# Patient Record
Sex: FEMALE | Race: BLACK OR AFRICAN AMERICAN | NOT HISPANIC OR LATINO | URBAN - METROPOLITAN AREA
[De-identification: names, ages, dates, MRNs, and addresses within clinical notes are randomized per-mention and may not be internally consistent; named-entity substitution may affect disease eponyms.]

---

## 2019-08-10 ENCOUNTER — INPATIENT (INPATIENT)
Facility: HOSPITAL | Age: 34
LOS: 3 days | Discharge: HOME | DRG: 854 | End: 2019-08-14
Attending: HOSPITALIST | Admitting: HOSPITALIST
Payer: COMMERCIAL

## 2019-08-10 VITALS
RESPIRATION RATE: 16 BRPM | HEART RATE: 117 BPM | TEMPERATURE: 100 F | SYSTOLIC BLOOD PRESSURE: 147 MMHG | HEIGHT: 64 IN | DIASTOLIC BLOOD PRESSURE: 89 MMHG | WEIGHT: 244.05 LBS | OXYGEN SATURATION: 97 %

## 2019-08-10 LAB
ALBUMIN SERPL ELPH-MCNC: 4 G/DL — SIGNIFICANT CHANGE UP (ref 3.3–5)
ALP SERPL-CCNC: 100 U/L — SIGNIFICANT CHANGE UP (ref 40–120)
ALT FLD-CCNC: 10 U/L — SIGNIFICANT CHANGE UP (ref 10–45)
ANION GAP SERPL CALC-SCNC: 20 MMOL/L — HIGH (ref 5–17)
AST SERPL-CCNC: 11 U/L — SIGNIFICANT CHANGE UP (ref 10–40)
BASE EXCESS BLDV CALC-SCNC: 3.3 MMOL/L — HIGH (ref -2–2)
BILIRUB SERPL-MCNC: 1 MG/DL — SIGNIFICANT CHANGE UP (ref 0.2–1.2)
BUN SERPL-MCNC: 6 MG/DL — LOW (ref 7–23)
CA-I SERPL-SCNC: 1.11 MMOL/L — LOW (ref 1.12–1.3)
CALCIUM SERPL-MCNC: 9.3 MG/DL — SIGNIFICANT CHANGE UP (ref 8.4–10.5)
CHLORIDE BLDV-SCNC: 99 MMOL/L — SIGNIFICANT CHANGE UP (ref 96–108)
CHLORIDE SERPL-SCNC: 92 MMOL/L — LOW (ref 96–108)
CO2 BLDV-SCNC: 30 MMOL/L — SIGNIFICANT CHANGE UP (ref 22–30)
CO2 SERPL-SCNC: 22 MMOL/L — SIGNIFICANT CHANGE UP (ref 22–31)
CREAT SERPL-MCNC: 0.7 MG/DL — SIGNIFICANT CHANGE UP (ref 0.5–1.3)
GAS PNL BLDV: 131 MMOL/L — LOW (ref 135–145)
GAS PNL BLDV: SIGNIFICANT CHANGE UP
GAS PNL BLDV: SIGNIFICANT CHANGE UP
GLUCOSE BLDV-MCNC: 410 MG/DL — HIGH (ref 70–99)
GLUCOSE SERPL-MCNC: 414 MG/DL — HIGH (ref 70–99)
HCO3 BLDV-SCNC: 29 MMOL/L — SIGNIFICANT CHANGE UP (ref 21–29)
HCT VFR BLD CALC: 43.2 % — SIGNIFICANT CHANGE UP (ref 34.5–45)
HCT VFR BLDA CALC: 42 % — SIGNIFICANT CHANGE UP (ref 39–50)
HGB BLD CALC-MCNC: 13.5 G/DL — SIGNIFICANT CHANGE UP (ref 11.5–15.5)
HGB BLD-MCNC: 12.6 G/DL — SIGNIFICANT CHANGE UP (ref 11.5–15.5)
LACTATE BLDV-MCNC: 1.9 MMOL/L — SIGNIFICANT CHANGE UP (ref 0.7–2)
MCHC RBC-ENTMCNC: 24.6 PG — LOW (ref 27–34)
MCHC RBC-ENTMCNC: 29.3 GM/DL — LOW (ref 32–36)
MCV RBC AUTO: 84 FL — SIGNIFICANT CHANGE UP (ref 80–100)
PCO2 BLDV: 49 MMHG — SIGNIFICANT CHANGE UP (ref 35–50)
PH BLDV: 7.39 — SIGNIFICANT CHANGE UP (ref 7.35–7.45)
PLATELET # BLD AUTO: 319 K/UL — SIGNIFICANT CHANGE UP (ref 150–400)
PO2 BLDV: 24 MMHG — LOW (ref 25–45)
POTASSIUM BLDV-SCNC: 3.4 MMOL/L — LOW (ref 3.5–5.3)
POTASSIUM SERPL-MCNC: 3.6 MMOL/L — SIGNIFICANT CHANGE UP (ref 3.5–5.3)
POTASSIUM SERPL-SCNC: 3.6 MMOL/L — SIGNIFICANT CHANGE UP (ref 3.5–5.3)
PROT SERPL-MCNC: 8.3 G/DL — SIGNIFICANT CHANGE UP (ref 6–8.3)
RBC # BLD: 5.14 M/UL — SIGNIFICANT CHANGE UP (ref 3.8–5.2)
RBC # FLD: 14.5 % — SIGNIFICANT CHANGE UP (ref 10.3–14.5)
SAO2 % BLDV: 37 % — LOW (ref 67–88)
SODIUM SERPL-SCNC: 134 MMOL/L — LOW (ref 135–145)
WBC # BLD: 13 K/UL — HIGH (ref 3.8–10.5)
WBC # FLD AUTO: 13 K/UL — HIGH (ref 3.8–10.5)

## 2019-08-10 PROCEDURE — 71260 CT THORAX DX C+: CPT | Mod: 26

## 2019-08-10 PROCEDURE — 99218: CPT

## 2019-08-10 RX ORDER — SODIUM CHLORIDE 9 MG/ML
1000 INJECTION INTRAMUSCULAR; INTRAVENOUS; SUBCUTANEOUS
Refills: 0 | Status: DISCONTINUED | OUTPATIENT
Start: 2019-08-10 | End: 2019-08-11

## 2019-08-10 RX ORDER — DEXTROSE 50 % IN WATER 50 %
12.5 SYRINGE (ML) INTRAVENOUS ONCE
Refills: 0 | Status: DISCONTINUED | OUTPATIENT
Start: 2019-08-10 | End: 2019-08-14

## 2019-08-10 RX ORDER — SODIUM CHLORIDE 9 MG/ML
1000 INJECTION, SOLUTION INTRAVENOUS
Refills: 0 | Status: DISCONTINUED | OUTPATIENT
Start: 2019-08-10 | End: 2019-08-14

## 2019-08-10 RX ORDER — ACETAMINOPHEN 500 MG
975 TABLET ORAL EVERY 6 HOURS
Refills: 0 | Status: DISCONTINUED | OUTPATIENT
Start: 2019-08-10 | End: 2019-08-11

## 2019-08-10 RX ORDER — SODIUM CHLORIDE 9 MG/ML
2000 INJECTION, SOLUTION INTRAVENOUS ONCE
Refills: 0 | Status: COMPLETED | OUTPATIENT
Start: 2019-08-10 | End: 2019-08-10

## 2019-08-10 RX ORDER — DEXTROSE 50 % IN WATER 50 %
15 SYRINGE (ML) INTRAVENOUS ONCE
Refills: 0 | Status: DISCONTINUED | OUTPATIENT
Start: 2019-08-10 | End: 2019-08-14

## 2019-08-10 RX ORDER — DEXTROSE 50 % IN WATER 50 %
25 SYRINGE (ML) INTRAVENOUS ONCE
Refills: 0 | Status: DISCONTINUED | OUTPATIENT
Start: 2019-08-10 | End: 2019-08-14

## 2019-08-10 RX ORDER — SODIUM CHLORIDE 9 MG/ML
3 INJECTION INTRAMUSCULAR; INTRAVENOUS; SUBCUTANEOUS EVERY 8 HOURS
Refills: 0 | Status: DISCONTINUED | OUTPATIENT
Start: 2019-08-10 | End: 2019-08-14

## 2019-08-10 RX ORDER — INSULIN LISPRO 100/ML
VIAL (ML) SUBCUTANEOUS
Refills: 0 | Status: DISCONTINUED | OUTPATIENT
Start: 2019-08-10 | End: 2019-08-11

## 2019-08-10 RX ORDER — INSULIN LISPRO 100/ML
VIAL (ML) SUBCUTANEOUS AT BEDTIME
Refills: 0 | Status: DISCONTINUED | OUTPATIENT
Start: 2019-08-10 | End: 2019-08-11

## 2019-08-10 RX ORDER — GLUCAGON INJECTION, SOLUTION 0.5 MG/.1ML
1 INJECTION, SOLUTION SUBCUTANEOUS ONCE
Refills: 0 | Status: DISCONTINUED | OUTPATIENT
Start: 2019-08-10 | End: 2019-08-14

## 2019-08-10 RX ORDER — KETOROLAC TROMETHAMINE 30 MG/ML
15 SYRINGE (ML) INJECTION ONCE
Refills: 0 | Status: DISCONTINUED | OUTPATIENT
Start: 2019-08-10 | End: 2019-08-10

## 2019-08-10 RX ADMIN — Medication 100 MILLIGRAM(S): at 18:21

## 2019-08-10 RX ADMIN — Medication 2: at 22:08

## 2019-08-10 RX ADMIN — Medication 975 MILLIGRAM(S): at 22:57

## 2019-08-10 RX ADMIN — SODIUM CHLORIDE 2000 MILLILITER(S): 9 INJECTION, SOLUTION INTRAVENOUS at 18:21

## 2019-08-10 RX ADMIN — Medication 15 MILLIGRAM(S): at 18:21

## 2019-08-10 RX ADMIN — SODIUM CHLORIDE 150 MILLILITER(S): 9 INJECTION INTRAMUSCULAR; INTRAVENOUS; SUBCUTANEOUS at 22:12

## 2019-08-10 RX ADMIN — Medication 975 MILLIGRAM(S): at 22:16

## 2019-08-10 RX ADMIN — SODIUM CHLORIDE 3 MILLILITER(S): 9 INJECTION INTRAMUSCULAR; INTRAVENOUS; SUBCUTANEOUS at 22:06

## 2019-08-10 NOTE — ED CDU PROVIDER INITIAL DAY NOTE - PHYSICAL EXAMINATION
Skin: large induration about 7 cm abscess on upper left mid thoracic back, tender to palpation, erythematous, non draining but with an area purulence

## 2019-08-10 NOTE — ED CDU PROVIDER INITIAL DAY NOTE - OBJECTIVE STATEMENT
33y/o female PMHx DM II non compliant on metformin 500mg BID and HTN non compliant on Labetalol was directed to the ED by Urgent Care for back draining back abscess. Patient states that 9 days ago her sister noticed a bump on her back and thought it might have been a mosquito bite. About 2 days later she began to notice that the bump started to drain yellow fluid. During the past week she has had subjective fevers, night sweats, chills, and pain at the site of bump. She denies IVDA, chest pain, SOB, abdominal pain, dry mouth, N/V/D, dysuria, urinary freq, cough, back pain, headache, neck pain    ED course: WBC 13, Gap 20, glucose 414, negative HCG, Lactate 1.9, CT chest showed phegmon in left upper back. Obtained blood cultures x2. Patient received IVF, toradol, and clindamycin

## 2019-08-10 NOTE — ED PROVIDER NOTE - OBJECTIVE STATEMENT
35y/o F w/ h/o T2DM and HTN presents to the ED with a boil on the back. Patient states that 9 days ago her sister noticed a bump on her back and thought it might have been a mosquito bite. About 2 days later she began to notice that the bump started to drain yellow fluid. During the past week she has had subjective fevers, night sweats, chills, and pain at the site of bump. She denies IVDA, chest pain, SOB, abdominal pain, dry mouth.

## 2019-08-10 NOTE — ED PROVIDER NOTE - NS ED ROS FT
GENERAL: subjective fever and chills  HEENT: denies congestion, dysphagia, lightheadedness, dry mouth  CARDIAC: denies chest pain, palpitations  PULM: denies dyspnea, wheezing, cough  GI: denies abdominal pain, nausea, vomiting, diarrhea, constipation, melena, hematochezia  : denies dysuria, frequency, incontinence, hematuria  NEURO: denies headache, motor weakness, sensory changes  MSK: denies joint or muscle pain  SKIN: new rashes bump on back, no hives  HEME: denies active bleeding, bruising

## 2019-08-10 NOTE — ED ADULT NURSE NOTE - OBJECTIVE STATEMENT
35 y/o female history of DM Type 2 presents to the ED from urgent care c/o boil in the back. Patient states that 9 days ago she had a bump on her back and had scratched it thinking it was a bug bite. Patient states that two days after it appeared she noticed yellow drainage coming out from the site. Urgent care sent patient here because they were unable to drain the cyst. Patient had a fever and took two tylenol prior to coming to ED. Denies  chills, n/v, weakness, abd pain, diarrhea/constipation, numbness/tingling, urinary s/s. Patient A&Ox3, in no respiratory distress, and denies chest pain. Large cyst on the back with redness and yellow drainage at site. Strong peripheral pulses.

## 2019-08-10 NOTE — ED CDU PROVIDER DISPOSITION NOTE - NSFOLLOWUPINSTRUCTIONS_ED_ALL_ED_FT
(1) You will need to follow-up with your PMD in 2-3 days for your back skin infection A copy of your results were given with you to bring to your appt.  (2) Be sure to review attached discharge paperwork.  (3) Drink plenty of fluids to stay hydrated.  (4) Continue your home medications as directed ALONG WITH clindamycin 300mg every 6 hours for the next 7 days  (5) Use Tylenol (extra strength over-the-counter) or Motrin (600mg which is three 200mg over-the-counter tablets at once every 6hrs) for your fevers or pain.  (6) Use warm compresses to the affected area for 20min at a time several times/day for next few days. Be careful not to burn your skin.  (7) Return to ER for uncontrolled fever, severe pain, trouble keeping down fluids, spread of infection or any other concerns.

## 2019-08-10 NOTE — ED ADULT NURSE REASSESSMENT NOTE - NS ED NURSE REASSESS COMMENT FT1
Received pt from TORRIE Barillas, received pt alert and responsive, oriented x4, denies any respiratory distress, SOB, or difficulty breathing. Pt transferred to CDU for bump on L side of upper back. No drainage noted at this time, pt is pain free. Pending IV clindamycin q8 next dose 0200, pt aware.  IV in place, patent and free of signs of infiltration,  + febrile, medicated with PO Tylenol, will reassess temp otherwise V/S stable. pt denies pain at this time. Pt educated on unit and unit rules, instructed patient to notify RN of any needed assistance, Pt verbalizes understanding, Call bell placed within reach. Safety maintained. Will continue to monitor. Family at bedside.

## 2019-08-10 NOTE — ED PROVIDER NOTE - PHYSICAL EXAMINATION
GENERAL: diaphoretic but in no acute distress, non-toxic appearing  HEAD: normocephalic, atraumatic  HEENT: normal conjunctiva, oral mucous membranes moist, neck supple  CARDIAC: tachycardic rate and regular rhythm, normal S1 and S2, no appreciable murmurs  PULM: clear to ascultation bilaterally, no crackles, rales, rhonchi, or wheezing  GI: abdomen nondistended, soft, nontender, no guarding or rebound tenderness  NEURO: alert and oriented x 3, normal speech, PERRLA, EOMI, no focal motor or sensory deficits, nonantalgic gait  MSK: no visible deformities, no peripheral edema, calf tenderness/redness/swelling  SKIN: large induration about 7 cm lesion on upper back, tender to palpation, erythematous   PSYCH: appropriate mood and affect

## 2019-08-10 NOTE — ED PROVIDER NOTE - CLINICAL SUMMARY MEDICAL DECISION MAKING FREE TEXT BOX
33y/o F w/ h/o T2DM presents with a lesion on the back associated with fevers, chills, and night sweats for the past week. U/s at bedside showed artifact so will CT back to r/o necrotizing fasciitis. Patient will have sepsis work up with Blood Cx, CBC, CMP, lactate, VBG to r/o DKA, and 2L of IVF. 33y/o F w/ h/o T2DM presents with a lesion on the back associated with fevers, chills, and night sweats for the past week. U/s at bedside showed artifact so will CT back to r/o necrotizing fasciitis. Patient will have sepsis work up with Blood Cx, CBC, CMP, lactate, VBG to r/o DKA, and 2L of IVF.    Attending Statement: Agree with the above.  SIRS +cellulitis though no overt e/o sepsis -- perfusing well, nontoxic, well appearing.  Has h/o DM2; will perform plan as above, check labs, give fluids, IV abx, US v CT to eval for underlying abscess/nec fasc.  --BMM

## 2019-08-10 NOTE — ED ADULT NURSE NOTE - NSIMPLEMENTINTERV_GEN_ALL_ED
Implemented All Universal Safety Interventions:  [place] to call system. Call bell, personal items and telephone within reach. Instruct patient to call for assistance. Room bathroom lighting operational. Non-slip footwear when patient is off stretcher. Physically safe environment: no spills, clutter or unnecessary equipment. Stretcher in lowest position, wheels locked, appropriate side rails in place.

## 2019-08-10 NOTE — ED PROVIDER NOTE - CARE PLAN
Principal Discharge DX:	Cellulitis  Goal:	L upper  Secondary Diagnosis:	Fever  Secondary Diagnosis:	Tachycardia

## 2019-08-10 NOTE — ED CDU PROVIDER DISPOSITION NOTE - ATTENDING CONTRIBUTION TO CARE
ED attending Dr Escobar Ortez note:  Patient re-evaluated and doing well.  No acute issues at  this time.  Lab and radiology tests reviewed with patient and/or family.    I have personally performed a face to face diagnostic evaluation on this patient.  I have reviewed the ACP note and agree with the history, exam, and plan of care, except as noted.  History and Exam by me showed back abscess/cellulitis, continual spiking fevers to admit for iv abx, no drainable collection on ct, reviewed labs early dka, ag closed, ISS continue.

## 2019-08-10 NOTE — ED CDU PROVIDER INITIAL DAY NOTE - DETAILS
35y/o female PMHx DM II non compliant on metformin 500mg BID and HTN non compliant on labatelol was directed to the ED by Urgent Care for back draining back abscess.   *CELLULITIS/ABSCESS  -clindamycin 600mg every 8 hours  -IVF at 150 cc/hour  -repeat labs in the morning including CBC, CMP and will add on hemA1c as pt non compliant with hyperglycemia. If hemA1c elevated will consider endo consult in the AM   -Discussed case with Dr. Johnson

## 2019-08-10 NOTE — ED CDU PROVIDER DISPOSITION NOTE - CLINICAL COURSE
33y/o female PMHx DM II non compliant on metformin 500mg BID and HTN non compliant on Labetalol was directed to the ED by Urgent Care for back draining back abscess. Patient states that 9 days ago her sister noticed a bump on her back and thought it might have been a mosquito bite. About 2 days later she began to notice that the bump started to drain yellow fluid. During the past week she has had subjective fevers, night sweats, chills, and pain at the site of bump. She denies IVDA, chest pain, SOB, abdominal pain, dry mouth, N/V/D, dysuria, urinary freq, cough, back pain, headache, neck pain    ED course: WBC 13, Gap 20, glucose 414, negative HCG, Lactate 1.9, CT chest showed phlegmon in left upper back. Obtained blood cultures x2. Patient received IVF, Toradol, and clindamycin. Patient remained stable while in CDU and was intermittently febrile while in CDU. 35y/o female PMHx DM II non compliant on metformin 500mg BID and HTN non compliant on Labetalol was directed to the ED by Urgent Care for back draining back abscess. Patient states that 9 days ago her sister noticed a bump on her back and thought it might have been a mosquito bite. About 2 days later she began to notice that the bump started to drain yellow fluid. During the past week she has had subjective fevers, night sweats, chills, and pain at the site of bump. She denies IVDA, chest pain, SOB, abdominal pain, dry mouth, N/V/D, dysuria, urinary freq, cough, back pain, headache, neck pain    ED course: WBC 13, Gap 20, glucose 414, negative HCG, Lactate 1.9, CT chest showed phlegmon in left upper back. Obtained blood cultures x2. Patient received IVF, Toradol, and clindamycin. Patient remained stable while in CDU and was intermittently febrile while in CDU, became tachycardic int he morning at 103 after 4 rounds of clindamycin, admitted to medicine for DM control, IVF and IVabx. case d/w Dr. Ortez.

## 2019-08-11 ENCOUNTER — TRANSCRIPTION ENCOUNTER (OUTPATIENT)
Age: 34
End: 2019-08-11

## 2019-08-11 DIAGNOSIS — E87.1 HYPO-OSMOLALITY AND HYPONATREMIA: ICD-10-CM

## 2019-08-11 DIAGNOSIS — E11.8 TYPE 2 DIABETES MELLITUS WITH UNSPECIFIED COMPLICATIONS: ICD-10-CM

## 2019-08-11 DIAGNOSIS — E11.65 TYPE 2 DIABETES MELLITUS WITH HYPERGLYCEMIA: ICD-10-CM

## 2019-08-11 DIAGNOSIS — Z29.9 ENCOUNTER FOR PROPHYLACTIC MEASURES, UNSPECIFIED: ICD-10-CM

## 2019-08-11 DIAGNOSIS — L03.90 CELLULITIS, UNSPECIFIED: ICD-10-CM

## 2019-08-11 DIAGNOSIS — E87.6 HYPOKALEMIA: ICD-10-CM

## 2019-08-11 DIAGNOSIS — E78.5 HYPERLIPIDEMIA, UNSPECIFIED: ICD-10-CM

## 2019-08-11 DIAGNOSIS — I10 ESSENTIAL (PRIMARY) HYPERTENSION: ICD-10-CM

## 2019-08-11 LAB
ALBUMIN SERPL ELPH-MCNC: 3.3 G/DL — SIGNIFICANT CHANGE UP (ref 3.3–5)
ALP SERPL-CCNC: 79 U/L — SIGNIFICANT CHANGE UP (ref 40–120)
ALT FLD-CCNC: 9 U/L — LOW (ref 10–45)
ANION GAP SERPL CALC-SCNC: 13 MMOL/L — SIGNIFICANT CHANGE UP (ref 5–17)
AST SERPL-CCNC: 11 U/L — SIGNIFICANT CHANGE UP (ref 10–40)
BASOPHILS # BLD AUTO: 0 K/UL — SIGNIFICANT CHANGE UP (ref 0–0.2)
BASOPHILS NFR BLD AUTO: 0.1 % — SIGNIFICANT CHANGE UP (ref 0–2)
BILIRUB SERPL-MCNC: 0.8 MG/DL — SIGNIFICANT CHANGE UP (ref 0.2–1.2)
BUN SERPL-MCNC: 7 MG/DL — SIGNIFICANT CHANGE UP (ref 7–23)
CALCIUM SERPL-MCNC: 8.2 MG/DL — LOW (ref 8.4–10.5)
CHLORIDE SERPL-SCNC: 97 MMOL/L — SIGNIFICANT CHANGE UP (ref 96–108)
CHOLEST SERPL-MCNC: 115 MG/DL — SIGNIFICANT CHANGE UP (ref 10–199)
CO2 SERPL-SCNC: 25 MMOL/L — SIGNIFICANT CHANGE UP (ref 22–31)
CREAT SERPL-MCNC: 0.72 MG/DL — SIGNIFICANT CHANGE UP (ref 0.5–1.3)
EOSINOPHIL # BLD AUTO: 0 K/UL — SIGNIFICANT CHANGE UP (ref 0–0.5)
EOSINOPHIL NFR BLD AUTO: 0.4 % — SIGNIFICANT CHANGE UP (ref 0–6)
GAS PNL BLDV: SIGNIFICANT CHANGE UP
GLUCOSE BLDC GLUCOMTR-MCNC: 186 MG/DL — HIGH (ref 70–99)
GLUCOSE BLDC GLUCOMTR-MCNC: 215 MG/DL — HIGH (ref 70–99)
GLUCOSE BLDC GLUCOMTR-MCNC: 243 MG/DL — HIGH (ref 70–99)
GLUCOSE BLDC GLUCOMTR-MCNC: 289 MG/DL — HIGH (ref 70–99)
GLUCOSE BLDC GLUCOMTR-MCNC: 331 MG/DL — HIGH (ref 70–99)
GLUCOSE SERPL-MCNC: 318 MG/DL — HIGH (ref 70–99)
HCT VFR BLD CALC: 35.6 % — SIGNIFICANT CHANGE UP (ref 34.5–45)
HDLC SERPL-MCNC: 18 MG/DL — LOW
HGB BLD-MCNC: 11.8 G/DL — SIGNIFICANT CHANGE UP (ref 11.5–15.5)
LIPID PNL WITH DIRECT LDL SERPL: 69 MG/DL — SIGNIFICANT CHANGE UP
LYMPHOCYTES # BLD AUTO: 1 K/UL — SIGNIFICANT CHANGE UP (ref 1–3.3)
LYMPHOCYTES # BLD AUTO: 8.8 % — LOW (ref 13–44)
MCHC RBC-ENTMCNC: 27.8 PG — SIGNIFICANT CHANGE UP (ref 27–34)
MCHC RBC-ENTMCNC: 33.2 GM/DL — SIGNIFICANT CHANGE UP (ref 32–36)
MCV RBC AUTO: 83.6 FL — SIGNIFICANT CHANGE UP (ref 80–100)
MONOCYTES # BLD AUTO: 0.9 K/UL — SIGNIFICANT CHANGE UP (ref 0–0.9)
MONOCYTES NFR BLD AUTO: 7.9 % — SIGNIFICANT CHANGE UP (ref 2–14)
NEUTROPHILS # BLD AUTO: 9.1 K/UL — HIGH (ref 1.8–7.4)
NEUTROPHILS NFR BLD AUTO: 82.8 % — HIGH (ref 43–77)
PLATELET # BLD AUTO: 225 K/UL — SIGNIFICANT CHANGE UP (ref 150–400)
POTASSIUM SERPL-MCNC: 3.2 MMOL/L — LOW (ref 3.5–5.3)
POTASSIUM SERPL-SCNC: 3.2 MMOL/L — LOW (ref 3.5–5.3)
PROT SERPL-MCNC: 6.8 G/DL — SIGNIFICANT CHANGE UP (ref 6–8.3)
RBC # BLD: 4.26 M/UL — SIGNIFICANT CHANGE UP (ref 3.8–5.2)
RBC # FLD: 14.2 % — SIGNIFICANT CHANGE UP (ref 10.3–14.5)
SODIUM SERPL-SCNC: 135 MMOL/L — SIGNIFICANT CHANGE UP (ref 135–145)
TOTAL CHOLESTEROL/HDL RATIO MEASUREMENT: 6.4 RATIO — SIGNIFICANT CHANGE UP (ref 3.3–7.1)
TRIGL SERPL-MCNC: 139 MG/DL — SIGNIFICANT CHANGE UP (ref 10–149)
WBC # BLD: 11 K/UL — HIGH (ref 3.8–10.5)
WBC # FLD AUTO: 11 K/UL — HIGH (ref 3.8–10.5)

## 2019-08-11 PROCEDURE — 99223 1ST HOSP IP/OBS HIGH 75: CPT | Mod: GC

## 2019-08-11 PROCEDURE — 99254 IP/OBS CNSLTJ NEW/EST MOD 60: CPT | Mod: GC

## 2019-08-11 PROCEDURE — 99217: CPT

## 2019-08-11 RX ORDER — KETOROLAC TROMETHAMINE 30 MG/ML
30 SYRINGE (ML) INJECTION ONCE
Refills: 0 | Status: DISCONTINUED | OUTPATIENT
Start: 2019-08-11 | End: 2019-08-11

## 2019-08-11 RX ORDER — SODIUM CHLORIDE 9 MG/ML
1000 INJECTION INTRAMUSCULAR; INTRAVENOUS; SUBCUTANEOUS
Refills: 0 | Status: DISCONTINUED | OUTPATIENT
Start: 2019-08-11 | End: 2019-08-13

## 2019-08-11 RX ORDER — INSULIN GLARGINE 100 [IU]/ML
21 INJECTION, SOLUTION SUBCUTANEOUS EVERY MORNING
Refills: 0 | Status: DISCONTINUED | OUTPATIENT
Start: 2019-08-11 | End: 2019-08-12

## 2019-08-11 RX ORDER — VANCOMYCIN HCL 1 G
1000 VIAL (EA) INTRAVENOUS EVERY 12 HOURS
Refills: 0 | Status: DISCONTINUED | OUTPATIENT
Start: 2019-08-11 | End: 2019-08-11

## 2019-08-11 RX ORDER — LISINOPRIL 2.5 MG/1
5 TABLET ORAL DAILY
Refills: 0 | Status: DISCONTINUED | OUTPATIENT
Start: 2019-08-11 | End: 2019-08-12

## 2019-08-11 RX ORDER — INSULIN LISPRO 100/ML
VIAL (ML) SUBCUTANEOUS
Refills: 0 | Status: DISCONTINUED | OUTPATIENT
Start: 2019-08-11 | End: 2019-08-12

## 2019-08-11 RX ORDER — ACETAMINOPHEN 500 MG
975 TABLET ORAL EVERY 6 HOURS
Refills: 0 | Status: DISCONTINUED | OUTPATIENT
Start: 2019-08-11 | End: 2019-08-14

## 2019-08-11 RX ORDER — VANCOMYCIN HCL 1 G
1000 VIAL (EA) INTRAVENOUS EVERY 12 HOURS
Refills: 0 | Status: DISCONTINUED | OUTPATIENT
Start: 2019-08-11 | End: 2019-08-13

## 2019-08-11 RX ORDER — PIPERACILLIN AND TAZOBACTAM 4; .5 G/20ML; G/20ML
3.38 INJECTION, POWDER, LYOPHILIZED, FOR SOLUTION INTRAVENOUS ONCE
Refills: 0 | Status: COMPLETED | OUTPATIENT
Start: 2019-08-11 | End: 2019-08-11

## 2019-08-11 RX ORDER — PIPERACILLIN AND TAZOBACTAM 4; .5 G/20ML; G/20ML
3.38 INJECTION, POWDER, LYOPHILIZED, FOR SOLUTION INTRAVENOUS EVERY 8 HOURS
Refills: 0 | Status: DISCONTINUED | OUTPATIENT
Start: 2019-08-11 | End: 2019-08-13

## 2019-08-11 RX ORDER — ENOXAPARIN SODIUM 100 MG/ML
40 INJECTION SUBCUTANEOUS EVERY 12 HOURS
Refills: 0 | Status: DISCONTINUED | OUTPATIENT
Start: 2019-08-11 | End: 2019-08-14

## 2019-08-11 RX ORDER — PIPERACILLIN AND TAZOBACTAM 4; .5 G/20ML; G/20ML
3.38 INJECTION, POWDER, LYOPHILIZED, FOR SOLUTION INTRAVENOUS EVERY 8 HOURS
Refills: 0 | Status: DISCONTINUED | OUTPATIENT
Start: 2019-08-11 | End: 2019-08-11

## 2019-08-11 RX ORDER — INSULIN LISPRO 100/ML
VIAL (ML) SUBCUTANEOUS
Refills: 0 | Status: DISCONTINUED | OUTPATIENT
Start: 2019-08-11 | End: 2019-08-11

## 2019-08-11 RX ORDER — INSULIN LISPRO 100/ML
VIAL (ML) SUBCUTANEOUS AT BEDTIME
Refills: 0 | Status: DISCONTINUED | OUTPATIENT
Start: 2019-08-11 | End: 2019-08-11

## 2019-08-11 RX ORDER — POTASSIUM CHLORIDE 20 MEQ
40 PACKET (EA) ORAL ONCE
Refills: 0 | Status: COMPLETED | OUTPATIENT
Start: 2019-08-11 | End: 2019-08-11

## 2019-08-11 RX ORDER — INSULIN LISPRO 100/ML
VIAL (ML) SUBCUTANEOUS AT BEDTIME
Refills: 0 | Status: DISCONTINUED | OUTPATIENT
Start: 2019-08-11 | End: 2019-08-12

## 2019-08-11 RX ORDER — INSULIN LISPRO 100/ML
7 VIAL (ML) SUBCUTANEOUS
Refills: 0 | Status: DISCONTINUED | OUTPATIENT
Start: 2019-08-11 | End: 2019-08-12

## 2019-08-11 RX ADMIN — Medication 975 MILLIGRAM(S): at 05:55

## 2019-08-11 RX ADMIN — SODIUM CHLORIDE 3 MILLILITER(S): 9 INJECTION INTRAMUSCULAR; INTRAVENOUS; SUBCUTANEOUS at 13:18

## 2019-08-11 RX ADMIN — Medication 30 MILLIGRAM(S): at 02:23

## 2019-08-11 RX ADMIN — Medication 975 MILLIGRAM(S): at 23:37

## 2019-08-11 RX ADMIN — Medication 30 MILLIGRAM(S): at 02:30

## 2019-08-11 RX ADMIN — SODIUM CHLORIDE 3 MILLILITER(S): 9 INJECTION INTRAMUSCULAR; INTRAVENOUS; SUBCUTANEOUS at 05:55

## 2019-08-11 RX ADMIN — Medication 975 MILLIGRAM(S): at 18:20

## 2019-08-11 RX ADMIN — SODIUM CHLORIDE 125 MILLILITER(S): 9 INJECTION INTRAMUSCULAR; INTRAVENOUS; SUBCUTANEOUS at 18:03

## 2019-08-11 RX ADMIN — Medication 100 MILLIGRAM(S): at 02:14

## 2019-08-11 RX ADMIN — Medication 2: at 19:38

## 2019-08-11 RX ADMIN — Medication 600 MILLIGRAM(S): at 02:43

## 2019-08-11 RX ADMIN — Medication 975 MILLIGRAM(S): at 11:22

## 2019-08-11 RX ADMIN — ENOXAPARIN SODIUM 40 MILLIGRAM(S): 100 INJECTION SUBCUTANEOUS at 18:03

## 2019-08-11 RX ADMIN — Medication 975 MILLIGRAM(S): at 04:26

## 2019-08-11 RX ADMIN — INSULIN GLARGINE 21 UNIT(S): 100 INJECTION, SOLUTION SUBCUTANEOUS at 13:13

## 2019-08-11 RX ADMIN — Medication 250 MILLIGRAM(S): at 18:03

## 2019-08-11 RX ADMIN — LISINOPRIL 5 MILLIGRAM(S): 2.5 TABLET ORAL at 18:03

## 2019-08-11 RX ADMIN — Medication 3: at 08:54

## 2019-08-11 RX ADMIN — Medication 100 MILLIGRAM(S): at 09:46

## 2019-08-11 RX ADMIN — Medication 975 MILLIGRAM(S): at 11:55

## 2019-08-11 RX ADMIN — SODIUM CHLORIDE 3 MILLILITER(S): 9 INJECTION INTRAMUSCULAR; INTRAVENOUS; SUBCUTANEOUS at 23:31

## 2019-08-11 RX ADMIN — Medication 7 UNIT(S): at 13:15

## 2019-08-11 RX ADMIN — Medication 8: at 13:13

## 2019-08-11 RX ADMIN — Medication 975 MILLIGRAM(S): at 17:27

## 2019-08-11 RX ADMIN — Medication 40 MILLIEQUIVALENT(S): at 06:17

## 2019-08-11 RX ADMIN — SODIUM CHLORIDE 150 MILLILITER(S): 9 INJECTION INTRAMUSCULAR; INTRAVENOUS; SUBCUTANEOUS at 13:13

## 2019-08-11 RX ADMIN — PIPERACILLIN AND TAZOBACTAM 200 GRAM(S): 4; .5 INJECTION, POWDER, LYOPHILIZED, FOR SOLUTION INTRAVENOUS at 17:27

## 2019-08-11 RX ADMIN — PIPERACILLIN AND TAZOBACTAM 25 GRAM(S): 4; .5 INJECTION, POWDER, LYOPHILIZED, FOR SOLUTION INTRAVENOUS at 23:39

## 2019-08-11 RX ADMIN — Medication 7 UNIT(S): at 19:38

## 2019-08-11 NOTE — H&P ADULT - ATTENDING COMMENTS
Patient w/ active +purulence/pus, will broaden coverage for now to cover MRSA, given + purulence, persistent fevers and tachycardia, and will also cover w/ zosyn to cover for pseudomonas, in setting of uncontrolled DM. Wound culture taken, de-escalate abx if blood cultures come back negative and based on wound cultures. Currently, undrainable, if continue to grow, consider repeat imaging w/ ultrasound to look for possible access to I&D.    Needs formal endocrine consult w/ dietician. She states she has not been on any medications for DM, intermittently checks her sugars, and have been as high as 330s FGs at home.

## 2019-08-11 NOTE — ED CDU PROVIDER SUBSEQUENT DAY NOTE - ATTENDING CONTRIBUTION TO CARE
ED attending Dr Escobar Ortez note:  Patient re-evaluated and doing well.  No acute issues at  this time.  Lab and radiology tests reviewed with patient and/or family.  Patient stable for discharge.  I have personally performed a face to face diagnostic evaluation on this patient.  I have reviewed the ACP note and agree with the history, exam, and plan of care, except as noted.  History and Exam by me showed back abscess/cellulitis, continual spiking fevers to admit for iv abx, no drainable collection on ct, reviewed labs early dka, ag closed, ISS continue. ED attending Dr Escobar Ortez note:  Patient re-evaluated and doing well.  No acute issues at  this time.  Lab and radiology tests reviewed with patient and/or family.    I have personally performed a face to face diagnostic evaluation on this patient.  I have reviewed the ACP note and agree with the history, exam, and plan of care, except as noted.  History and Exam by me showed back abscess/cellulitis, continual spiking fevers to admit for iv abx, no drainable collection on ct, reviewed labs early dka, ag closed, ISS continue.

## 2019-08-11 NOTE — DISCHARGE NOTE PROVIDER - NSDCFUSCHEDAPPT_GEN_ALL_CORE_FT
JOSEFINA MITCHELL ; 09/09/2019 ; NPP Med Endocr [address] [facility] JOSEFINA MITCHELL ; 09/09/2019 ; NPP Med Endocr [address] [facility] JOSEFINA MITCHELL ; 09/09/2019 ; NPP Med Endocr [address] [facility] JOSEFINA MITCHELL ; 08/26/2019 ; NP Gen Surg [address]  JOSEFINA MITCHELL ; 09/09/2019 ; NP Med Endocr [address] [facility] JOSEFINA MITCHELL ; 08/26/2019 ; NP Gen Surg [address]  JOSEFINA MITCHELL ; 09/09/2019 ; NP Med Endocr [address] [facility] JOSEFINA MITCHELL ; 08/26/2019 ; NP Gen Surg [address]  JOSEFINA MITCHELL ; 09/09/2019 ; NP Med Endocr [address] [facility] JOSEFINA MITCHELL ; 08/26/2019 ; NP Gen Surg [address]  JOSEFINA MITCHELL ; 09/09/2019 ; NP Med Endocr [address] [facility] JOSEFINA MITCHELL ; 08/26/2019 ; NP Gen Surg [address]  JOSEFINA MITCHELL ; 09/09/2019 ; NP Med Endocr [address] [facility] JOSEFINA MITCHELL ; 08/26/2019 ; NP Gen Surg [address]  JOSEFINA MITCHELL ; 09/09/2019 ; NP Med Endocr [address] [facility] JOSEFINA MITCHELL ; 08/26/2019 ; NP Gen Surg [address]  JOSEFINA MITCHELL ; 09/09/2019 ; NP Med Endocr [address] [facility] JOSEFINA MITCHELL ; 08/26/2019 ; NP Gen Surg [address]  JOSEFINA MITCHELL ; 09/09/2019 ; NP Med Endocr [address] [facility] JOSEFINA MITCHELL ; 11/14/2019 ; NPP Med Endocr [address] [facility]

## 2019-08-11 NOTE — H&P ADULT - NSHPREVIEWOFSYSTEMS_GEN_ALL_CORE
CONSTITUTIONAL: +fever, +chills  EYES: No eye pain, visual disturbances, or discharge  ENMT: No difficulty hearing, tinnitus, vertigo; No sinus or throat pain  RESPIRATORY: No cough, wheezing, chills or hemoptysis; No shortness of breath  CARDIOVASCULAR: No chest pain, palpitations, dizziness, or leg swelling  GASTROINTESTINAL: No abdominal or epigastric pain. No nausea, vomiting, or hematemesis; No diarrhea or constipation. No melena or hematochezia.  GENITOURINARY: No dysuria, frequency, hematuria, or incontinence  NEUROLOGICAL: No headaches, loss of strength, numbness, or tremors  SKIN: Upper back with mildly painful lesion.  LYMPH NODES: No enlarged glands  ENDOCRINE: No heat or cold intolerance; No polydipsia or polyuria  MUSCULOSKELETAL: No joint pain or swelling;   PSYCHIATRIC: Denies depression, anxiety  HEME/LYMPH: No easy bruising, or bleeding gums  ALLERGY AND IMMUNOLOGIC: No hives or eczema

## 2019-08-11 NOTE — H&P ADULT - NSHPLABSRESULTS_GEN_ALL_CORE
LABS:                        11.8   11.0  )-----------( 225      ( 11 Aug 2019 04:41 )             35.6     11 Aug 2019 04:41    135    |  97     |  7      ----------------------------<  318    3.2     |  25     |  0.72     Ca    8.2        11 Aug 2019 04:41    TPro  6.8    /  Alb  3.3    /  TBili  0.8    /  DBili  x      /  AST  11     /  ALT  9      /  AlkPhos  79     11 Aug 2019 04:41      CAPILLARY BLOOD GLUCOSE      POCT Blood Glucose.: 289 mg/dL (11 Aug 2019 08:51)  POCT Blood Glucose.: 321 mg/dL (10 Aug 2019 22:02)    BLOOD CULTURE    RADIOLOGY & ADDITIONAL TESTS:    EXAM:  CT CHEST IC                            PROCEDURE DATE:  08/10/2019            INTERPRETATION:  CLINICAL INFORMATION: Fever.  Painful mass in left upper   back for one week.  Evaluate for abscess..    COMPARISON: None.    PROCEDURE:   CT of the Chest was performed with intravenous contrast.  40 ml of Omnipaque 350 was injected intravenously.   Sagittal and coronal reformats were performed.      FINDINGS:    LUNGS AND AIRWAYS: Lungs are clear.  No endobronchial lesion.    PLEURA: No pleural effusion or pneumothorax.    MEDIASTINUM AND VAUGHN: No lymphadenopathy.    VESSELS: Within normal limits.    HEART: Within normal limits.    CHEST WALL AND LOWER NECK: There is ill-defined subcutaneous inflammatory   change and phlegmon in the left upper back.  There is no discrete   peripherally enhancing abscess or drainable fluid collection.    VISUALIZED UPPER ABDOMEN: Hepatic steatosis.    BONES: Within normal limits.    IMPRESSION:     Ill-defined subcutaneous inflammatory change and phlegmon in the left   upper back.  No discrete peripherally enhancing abscess or drainable   fluid collection.  This can be followed with ultrasound                    HANS MOHAMUD M.D., RADIOLOGY RESIDENT  This document has been electronically signed.  RICARDO VARMA M.D.,ATTENDING RADIOLOGIST  This document has been electronically signed. Aug 10 2019  9:32PM                  Imaging Personally Reviewed:  [X] YES

## 2019-08-11 NOTE — H&P ADULT - PROBLEM SELECTOR PLAN 4
- Patient with history of HTN. SBP 140s noted in ED.  - Will need renal protection in light of uncontrolled DM  - Will initiate lisinopril - Patient with history of HTN. SBP 140s noted in ED.  - Will need renal protection in light of uncontrolled DM  - Patient was on some BP regimen, however patient does not recall names of meds. Pharmacy does not retain record.  - SBP 140s in ED. Will initiate lisinopril during current admission. - Patient with history of HTN. SBP 140s noted in ED.  - Will need renal protection in light of uncontrolled DM  - Patient was on some BP regimen, however patient does not recall names of meds. Pharmacy does not retain record.  - SBP 140s in ED. Tolerable BP at this time. Will initiate ACEI during current admission.

## 2019-08-11 NOTE — CONSULT NOTE ADULT - PROBLEM SELECTOR RECOMMENDATION 9
-Patient with uncontrolled Type 2 DM, A1C 13, not on any meds outpatient.  -While inpatient, can start weight based regimen of lantus 21 units and humalog 7 units TID with meals. C/w low dose humalog correctional with meals and bedtime.  -Extensive discussion on diet and lifestyle modifications as well as consequences of uncontrolled DM.  -RD consult  -DC plan basal/bolus insulin. Pt aggreeable and motivated.  -Patient will need education by bedside nurse on insulin pen use and glucometer use prior to dc.  -Patient will need prescriptions for lantus and humalog (or covered equivalents), and insulin pen needles, glucometer, lancets, and test strips.   -Patient can f/u with endocrine at [address] [place] [address] [phone]. -Patient with uncontrolled Type 2 DM, A1C 13, not on any meds outpatient.  -While inpatient, can start weight based regimen of lantus 21 units and humalog 7 units TID with meals. C/w low dose humalog correctional with meals and bedtime.  -Extensive discussion on diet and lifestyle modifications as well as consequences of uncontrolled DM.  -RD consult  -DC plan basal/bolus insulin. Pt agreeable and motivated.  -Patient will need education by bedside nurse on insulin pen use and glucometer use prior to dc.  -Patient will need prescriptions for lantus and humalog (or covered equivalents), and insulin pen needles, glucometer, lancets, and test strips.   -Patient can f/u with endocrine at [address] [place] [address] [phone].

## 2019-08-11 NOTE — H&P ADULT - PROBLEM SELECTOR PLAN 1
- P/w fever, tachycardia, and leukocytosis, in the setting of phlegmon on CT.  - S/p 2L LR and currently on NS @ 150.  - S/p clindamycin 900 mg IV clindamycin. Continue with 600 mg IV Q8H.  - Lac 1.9 on presentation, not elevated.  - Will continue to monitor with antibiotics and IVF hydration.  - BCx sent. Will also f/u UA. - P/w fever, tachycardia, and leukocytosis, in the setting of phlegmon on CT.  - S/p 2L LR and currently on NS @ 150.  - S/p clindamycin 900 mg IV clindamycin. Continue with 600 mg IV Q8H.  - Lac 1.9 on presentation, not elevated. Cap refill <2 sec  - Will continue to monitor with antibiotics and IVF hydration.  - BCx sent. Will also f/u UA. - P/w fever, tachycardia, and leukocytosis, in the setting of phlegmon on CT.  - S/p 2L LR and currently on NS @ 150.  - S/p clindamycin 900 mg IV clindamycin. Continue with 600 mg IV Q8H.  - Lac 1.9 on presentation, not elevated. Cap refill <2 sec  - Will continue to monitor with antibiotics and IVF hydration.  - BCx sent. Will also f/u UA.  - If persistently febrile, will broaden antibiotic coverage. - P/w fever, tachycardia, and leukocytosis, in the setting of phlegmon on CT.  - S/p 2L LR and currently on NS @ 150.  - S/p clindamycin 900 mg IV clindamycin x1 in ED. Will continue with 900 mg IV Q8H, given her persistent fever and high BMI. Confirmed with pharmacy re: dosing.  - Lac 1.9 on presentation, not elevated. Cap refill <2 sec  - Will continue to monitor with antibiotics and IVF hydration.  - BCx sent. Will also f/u UA.  - If persistently febrile, will broaden antibiotic coverage. - P/w fever, tachycardia, and leukocytosis, in the setting of phlegmon on CT.  - S/p 2L LR and currently on NS @ 150.  - S/p clindamycin 900 mg IV clindamycin then 600 mg Q6H in ED.  - Will broaden antibiotic coverage given the purulence and persistent fever. Will dose vancomycin and Zosyn.  - Lac 1.9 on presentation, not elevated. Cap refill <2 sec  - Will continue to monitor with antibiotics and IVF hydration.  - BCx sent. Will also f/u UA.  - If persistently febrile, will broaden antibiotic coverage.

## 2019-08-11 NOTE — CONSULT NOTE ADULT - SUBJECTIVE AND OBJECTIVE BOX
34-yo F w/ PMH of T2DM (A1C 13) presenting with fever, chills, and a boil in the back. Patient noticed a bump in the left upper back 9 days ago. Patient thought was from a mosquito bite and scratched the lesion. Patient noticed yellow drainage starting 2-3 days after, associated with fever, chills, and pain at the lesion in the back. Patient was diagnosed HTN and DM 2-3 years ago. Patient was prescribed metformin and 2 types of BP meds. Patient does not recall names of the meds (confirmed with pharmacy - no record available). Used meds on and off when she was on them. Patient does not remember when she took her meds last time. Currently not on any meds. Does not check BP or FS at home. She was told that her A1C was 9s last year. Currently endorses fever, chills, upper back pain at the lesion. Denies chest pain, dizziness, headache, nausea, vomiting, SOB, visual changes, dysuria, or numbness.    In ED, T 99.9 (Tmax 102.2),  -> 120, /89, RR 16, Sat 97% RA. WBC 13, Na 134, Gluc 414, A1C 13, and AG 20. CT showing SQ inflammation and phlegmon in the left upper back but no abscess. Clindamycin was started. 2L LR was given and NS was started at 150. (11 Aug 2019 11:04)    Endocrine History:  Patient reports being first diagnosed with Type 2 DM in 2016. Reports was prescribed metformin and was not taking. Did not tolerate the regular and extended release due to GI side effects. Has not taken any meds for DM in >1 year. Does not know where her glucometer is. Diet is poor, drinks juice and soda, high carb diet, fast food. Denies any polyuria, polydipsia, vision changes, neuropathy. has not seen optho.      PAST MEDICAL & SURGICAL HISTORY:  Diabetes mellitus type 2, uncontrolled, with complications  HTN (hypertension)  No significant past surgical history      FAMILY HISTORY: Mother with Type 2 DM      Social History: no smoking, alcohol use    Outpatient Medications:  None    MEDICATIONS  (STANDING):  clindamycin IVPB 900 milliGRAM(s) IV Intermittent every 8 hours  dextrose 5%. 1000 milliLiter(s) (50 mL/Hr) IV Continuous <Continuous>  dextrose 50% Injectable 12.5 Gram(s) IV Push once  dextrose 50% Injectable 25 Gram(s) IV Push once  dextrose 50% Injectable 25 Gram(s) IV Push once  enoxaparin Injectable 40 milliGRAM(s) SubCutaneous every 12 hours  insulin glargine Injectable (LANTUS) 21 Unit(s) SubCutaneous every morning  insulin lispro (HumaLOG) corrective regimen sliding scale   SubCutaneous three times a day before meals  insulin lispro (HumaLOG) corrective regimen sliding scale   SubCutaneous at bedtime  insulin lispro Injectable (HumaLOG) 7 Unit(s) SubCutaneous three times a day before meals  piperacillin/tazobactam IVPB. 3.375 Gram(s) IV Intermittent once  piperacillin/tazobactam IVPB.. 3.375 Gram(s) IV Intermittent every 8 hours  sodium chloride 0.9% lock flush 3 milliLiter(s) IV Push every 8 hours  sodium chloride 0.9%. 1000 milliLiter(s) (125 mL/Hr) IV Continuous <Continuous>  vancomycin  IVPB 1000 milliGRAM(s) IV Intermittent every 12 hours    MEDICATIONS  (PRN):  acetaminophen   Tablet .. 975 milliGRAM(s) Oral every 6 hours PRN Temp greater or equal to 38C (100.4F), Mild Pain (1 - 3)  dextrose 40% Gel 15 Gram(s) Oral once PRN Blood Glucose LESS THAN 70 milliGRAM(s)/deciliter  glucagon  Injectable 1 milliGRAM(s) IntraMuscular once PRN Glucose LESS THAN 70 milligrams/deciliter      Allergies    No Known Allergies    Intolerances      Review of Systems:  Constitutional: No fever  Eyes: No blurry vision  Neuro: No tremors  HEENT: No pain  Cardiovascular: No chest pain, palpitations  Respiratory: No SOB, no cough  GI: No nausea, vomiting, abdominal pain  : No dysuria  Skin: +skin infection on back  Endocrine: no polyuria, polydipsia  Hem/lymph: no swelling  Osteoporosis: no fractures    ALL OTHER SYSTEMS REVIEWED NEGATIVE    PHYSICAL EXAM:  VITALS: T(C): 37.2 (08-11-19 @ 14:15)  T(F): 98.9 (08-11-19 @ 14:15), Max: 102.2 (08-11-19 @ 02:17)  HR: 112 (08-11-19 @ 14:15) (102 - 120)  BP: 143/88 (08-11-19 @ 14:15) (118/78 - 155/98)  RR:  (16 - 19)  SpO2:  (96% - 100%)  Wt(kg): --  GENERAL: NAD, well-groomed, well-developed, +overweight  EYES: No proptosis, no lid lag, anicteric  HEENT:  Atraumatic, Normocephalic, moist mucous membranes  THYROID: Normal size, no palpable nodules  RESPIRATORY: Clear to auscultation bilaterally; No rales, rhonchi, wheezing  CARDIOVASCULAR: Regular rate and rhythm; No murmurs; no peripheral edema  GI: Soft, nontender, non distended, normal bowel sounds  SKIN: +skin infection on back in dressing  MUSCULOSKELETAL: Full range of motion, normal strength  NEURO: sensation intact, extraocular movements intact, no tremor  PSYCH: Alert and oriented x 3, normal affect, normal mood  CUSHING'S SIGNS: no striae    POCT Blood Glucose.: 331 mg/dL (08-11-19 @ 12:56)  POCT Blood Glucose.: 289 mg/dL (08-11-19 @ 08:51)  POCT Blood Glucose.: 321 mg/dL (08-10-19 @ 22:02)                            11.8   11.0  )-----------( 225      ( 11 Aug 2019 04:41 )             35.6       08-11    135  |  97  |  7   ----------------------------<  318<H>  3.2<L>   |  25  |  0.72    EGFR if : 127  EGFR if non : 109    Ca    8.2<L>      08-11    TPro  6.8  /  Alb  3.3  /  TBili  0.8  /  DBili  x   /  AST  11  /  ALT  9<L>  /  AlkPhos  79  08-11      Hemoglobin A1C, Whole Blood: 13.0 % <H> [4.0 - 5.6] (08-11-19 @ 05:36)      08-11 Chol 115 LDL 69 HDL 18<L> Trig 139 HPI: 34-yo F w/ PMH of T2DM (A1C 13) presenting with fever, chills, and a boil in the back. Patient noticed a bump in the left upper back 9 days ago. Patient thought was from a mosquito bite and scratched the lesion. Patient noticed yellow drainage starting 2-3 days after, associated with fever, chills, and pain at the lesion in the back. Patient was diagnosed HTN and DM 2-3 years ago. Patient was prescribed metformin and 2 types of BP meds. Patient does not recall names of the meds (confirmed with pharmacy - no record available). Used meds on and off when she was on them. Patient does not remember when she took her meds last time. Currently not on any meds. Does not check BP or FS at home. She was told that her A1C was 9s last year. Currently endorses fever, chills, upper back pain at the lesion. Denies chest pain, dizziness, headache, nausea, vomiting, SOB, visual changes, dysuria, or numbness.    In ED, T 99.9 (Tmax 102.2),  -> 120, /89, RR 16, Sat 97% RA. WBC 13, Na 134, Gluc 414, A1C 13, and AG 20. CT showing SQ inflammation and phlegmon in the left upper back but no abscess. Clindamycin was started. 2L LR was given and NS was started at 150. (11 Aug 2019 11:04)    Endocrine History:  Patient reports being first diagnosed with Type 2 DM in 2016. Reports was prescribed metformin and was not taking. Did not tolerate the regular and extended release due to GI side effects. Has not taken any meds for DM in >1 year. Does not know where her glucometer is. Does not monitor diet, drinks juice and soda, high carb diet, fast food. Denies any polyuria, polydipsia, vision changes, neuropathy. has not seen optho.      PAST MEDICAL & SURGICAL HISTORY:  Diabetes mellitus type 2, uncontrolled, with complications  HTN (hypertension)  No significant past surgical history      FAMILY HISTORY: Mother with Type 2 DM      Social History: no smoking, alcohol use    Outpatient Medications:  None    MEDICATIONS  (STANDING):  clindamycin IVPB 900 milliGRAM(s) IV Intermittent every 8 hours  dextrose 5%. 1000 milliLiter(s) (50 mL/Hr) IV Continuous <Continuous>  dextrose 50% Injectable 12.5 Gram(s) IV Push once  dextrose 50% Injectable 25 Gram(s) IV Push once  dextrose 50% Injectable 25 Gram(s) IV Push once  enoxaparin Injectable 40 milliGRAM(s) SubCutaneous every 12 hours  insulin glargine Injectable (LANTUS) 21 Unit(s) SubCutaneous every morning  insulin lispro (HumaLOG) corrective regimen sliding scale   SubCutaneous three times a day before meals  insulin lispro (HumaLOG) corrective regimen sliding scale   SubCutaneous at bedtime  insulin lispro Injectable (HumaLOG) 7 Unit(s) SubCutaneous three times a day before meals  piperacillin/tazobactam IVPB. 3.375 Gram(s) IV Intermittent once  piperacillin/tazobactam IVPB.. 3.375 Gram(s) IV Intermittent every 8 hours  sodium chloride 0.9% lock flush 3 milliLiter(s) IV Push every 8 hours  sodium chloride 0.9%. 1000 milliLiter(s) (125 mL/Hr) IV Continuous <Continuous>  vancomycin  IVPB 1000 milliGRAM(s) IV Intermittent every 12 hours    MEDICATIONS  (PRN):  acetaminophen   Tablet .. 975 milliGRAM(s) Oral every 6 hours PRN Temp greater or equal to 38C (100.4F), Mild Pain (1 - 3)  dextrose 40% Gel 15 Gram(s) Oral once PRN Blood Glucose LESS THAN 70 milliGRAM(s)/deciliter  glucagon  Injectable 1 milliGRAM(s) IntraMuscular once PRN Glucose LESS THAN 70 milligrams/deciliter      Allergies    No Known Allergies      Review of Systems:  Constitutional: No fever  Eyes: No blurry vision  Neuro: No tremors  HEENT: No pain  Cardiovascular: No chest pain, palpitations  Respiratory: No SOB, no cough  GI: No nausea, vomiting, abdominal pain  : No dysuria  Skin: +skin infection on back  Endocrine: no polyuria, polydipsia  Hem/lymph: no swelling  Osteoporosis: no fractures    ALL OTHER SYSTEMS REVIEWED NEGATIVE    PHYSICAL EXAM:  VITALS: T(C): 37.2 (08-11-19 @ 14:15)  T(F): 98.9 (08-11-19 @ 14:15), Max: 102.2 (08-11-19 @ 02:17)  HR: 112 (08-11-19 @ 14:15) (102 - 120)  BP: 143/88 (08-11-19 @ 14:15) (118/78 - 155/98)  RR:  (16 - 19)  SpO2:  (96% - 100%)  Wt(kg): 111  GENERAL: NAD, well-groomed, well-developed, +overweight  EYES: No proptosis, no lid lag, anicteric  HEENT:  Atraumatic, Normocephalic, moist mucous membranes  THYROID: Normal size, no palpable nodules  RESPIRATORY: Clear to auscultation bilaterally; No rales, rhonchi, wheezing  CARDIOVASCULAR: Regular rate and rhythm; No murmurs; no peripheral edema  GI: Soft, nontender, non distended, normal bowel sounds  SKIN: +skin infection on back in dressing  MUSCULOSKELETAL: Full range of motion, normal strength  NEURO: sensation intact, extraocular movements intact, no tremor  PSYCH: Alert and oriented x 3, normal affect, normal mood  CUSHING'S SIGNS: no striae    POCT Blood Glucose.: 331 mg/dL (08-11-19 @ 12:56)  POCT Blood Glucose.: 289 mg/dL (08-11-19 @ 08:51)  POCT Blood Glucose.: 321 mg/dL (08-10-19 @ 22:02)                            11.8   11.0  )-----------( 225      ( 11 Aug 2019 04:41 )             35.6       08-11    135  |  97  |  7   ----------------------------<  318<H>  3.2<L>   |  25  |  0.72    EGFR if : 127  EGFR if non : 109    Ca    8.2<L>      08-11    TPro  6.8  /  Alb  3.3  /  TBili  0.8  /  DBili  x   /  AST  11  /  ALT  9<L>  /  AlkPhos  79  08-11      Hemoglobin A1C, Whole Blood: 13.0 % <H> [4.0 - 5.6] (08-11-19 @ 05:36)      08-11 Chol 115 LDL 69 HDL 18<L> Trig 139

## 2019-08-11 NOTE — H&P ADULT - NSICDXPASTMEDICALHX_GEN_ALL_CORE_FT
PAST MEDICAL HISTORY:  Diabetes mellitus type 2, uncontrolled, with complications     HTN (hypertension)

## 2019-08-11 NOTE — DISCHARGE NOTE PROVIDER - NSDCFUADDAPPT_GEN_ALL_CORE_FT
Surgery: Dr. Dubose  8/26/19 10:30AM    Endocrinology:  Sept 9th 4:30PM  Nov 14th 2:15PM Surgery: Dr. Dubose  8/26/19 10:30AM  [facility] [address], [address] [place] [address]   [phone] ( Please call to confirm the above address is correct, as doctor may see patients at different offices).       Endocrinology:  Sept 9th 4:30PM  Nov 14th 2:15PM

## 2019-08-11 NOTE — H&P ADULT - HISTORY OF PRESENT ILLNESS
*** incomplete note ***        34-yo F w/ PMH of T2DM, presenting with fever, chills, and a boil in the back. *** incomplete note ***      34-yo F w/ PMH of T2DM, presenting with fever, chills, and a boil in the back. Patient noticed a bump in the left upper back 9 days ago. Patient noticed yellow drainage starting 2 days after. Also patient experienced fever, chills, and pain at the lesion in the back.     In ED, T 99.9 (Tmax 102.2),  -> 120, /89, RR 16, Sat 97% RA. WBC 13, Na 134, Gluc 414, A1C 13, and AG 20. CT showing SQ inflammation and phlegmon in the left upper back but no abscess. Clindamycin was started. 2L LR was given and NS was started at 150. Moisés Christianson MD, PhD | PGY-2, Day Admit  Department of Internal Medicine  Pager [phone] ([facility]) / [phone] ([facility])  Spectra [phone]      34-yo F w/ PMH of T2DM, presenting with fever, chills, and a boil in the back. Patient noticed a bump in the left upper back 9 days ago. Patient noticed yellow drainage starting 3 days after, associated with fever, chills, and pain at the lesion in the back. Patient was diagnosed HTN and DM 2-3 years ago. Patient was prescribed metformin and 2 types of BP meds. Patient does not recall names of the meds (confirmed with pharmacy - no record available). Used meds on and off when she was on them. Patient does not remember when she took her meds last time. Currently not on any meds. Does not check BP or FS at home. She was told that her A1C was 9s last year. Currently endorses fever, chills, upper back pain at the lesion. Denies chest pain, dizziness, headache, nausea, vomiting, SOB, or numbness.    In ED, T 99.9 (Tmax 102.2),  -> 120, /89, RR 16, Sat 97% RA. WBC 13, Na 134, Gluc 414, A1C 13, and AG 20. CT showing SQ inflammation and phlegmon in the left upper back but no abscess. Clindamycin was started. 2L LR was given and NS was started at 150. Moisés Christianson MD, PhD | PGY-2, Day Admit  Department of Internal Medicine  Pager [phone] ([facility]) / [phone] ([facility])  Spectra [phone]      34-yo F w/ PMH of T2DM, presenting with fever, chills, and a boil in the back. Patient noticed a bump in the left upper back 9 days ago. Patient thought was from a mosquito bite and scratched the lesion. Patient noticed yellow drainage starting 2-3 days after, associated with fever, chills, and pain at the lesion in the back. Patient was diagnosed HTN and DM 2-3 years ago. Patient was prescribed metformin and 2 types of BP meds. Patient does not recall names of the meds (confirmed with pharmacy - no record available). Used meds on and off when she was on them. Patient does not remember when she took her meds last time. Currently not on any meds. Does not check BP or FS at home. She was told that her A1C was 9s last year. Currently endorses fever, chills, upper back pain at the lesion. Denies chest pain, dizziness, headache, nausea, vomiting, SOB, visual changes, dysuria, or numbness.    In ED, T 99.9 (Tmax 102.2),  -> 120, /89, RR 16, Sat 97% RA. WBC 13, Na 134, Gluc 414, A1C 13, and AG 20. CT showing SQ inflammation and phlegmon in the left upper back but no abscess. Clindamycin was started. 2L LR was given and NS was started at 150.

## 2019-08-11 NOTE — DISCHARGE NOTE PROVIDER - CARE PROVIDER_API CALL
Adalgisa Sargent)  EndocrinologyMetabDiabetes; Internal Medicine  [address] [place] [address]  Phone: [phone]  Fax: [phone]  Follow Up Time:     Joshua Dubose)  Surgery  [address], [address]  [place], [place] [id number]  Phone: [phone]  Fax: [phone]  Follow Up Time:

## 2019-08-11 NOTE — H&P ADULT - PROBLEM SELECTOR PLAN 2
- 9-day history of skin lesion in the setting of uncontrolled diabetes.  - Presenting septic with leukocytosis, fever, and tachycardia.  - S/p clindamycin 900 mg IV clindamycin. Continue with 600 mg IV Q8H.  - F/u BCx and UA. - 9-day history of skin lesion in the setting of uncontrolled diabetes.  - Presenting septic with leukocytosis, fever, and tachycardia.  - Started on clindamycin in ED. Will broaden antibiotic coverage with vanc and Zosyn as above.  - F/u BCx and UA.  - Will attempt to obtain wound culture

## 2019-08-11 NOTE — DISCHARGE NOTE PROVIDER - HOSPITAL COURSE
34-yo F w/ PMH of T2DM (A1C 13) presented with fever, chills, and a boil in the back. In ED, pt was febrile to 102.2F and tachycardic to 120. Initial workup was notable for WBC 13, Na 134, Gluc 414, A1C 13, and AG 20. CT showing SQ inflammation and phlegmon in the left upper back but no abscess. Pt got Clindamycin x1 dose and 2L LR.  NS was started at 150. 34-yo F w/ PMH of T2DM (A1C 13) presented with fever, chills, and a boil in the back. In ED, pt was febrile to 102.2F and tachycardic to 120. Initial workup was notable for WBC 13, Na 134, Gluc 414, A1C 13, and AG 20. CT showing SQ inflammation and phlegmon in the left upper back but no abscess. Pt got Clindamycin x1 dose and 2L LR. Pt was admitted to medicine floor for sepsis due to cellulitis. Antibiotic was broadened to vanc/zosyn and pt was started on maintenance fluid 125cc/hr. Endocrine was consulted for DM management. Pt was started on lantus 21U and humalog 7U TID. 34-yo F w/ PMH of T2DM (A1C 13) presented with fever, chills, and a boil in the back. In ED, pt was febrile to 102.2F and tachycardic to 120. Initial workup was notable for WBC 13, Na 134, Gluc 414, A1C 13, and AG 20. CT showing SQ inflammation and phlegmon in the left upper back but no abscess. Pt got Clindamycin x1 dose and 2L LR. Pt was admitted to medicine floor for sepsis due to back abscess. Antibiotic was broadened to vanc/zosyn and pt was started on maintenance fluid 125cc/hr. Surgery was consulted, and pt had I&D. Endocrine was consulted for DM management. Pt was started on lantus 24U and humalog 8U TID. 34-yo F w/ PMH of T2DM (A1C 13) presented with fever, chills, and a boil in the back. In ED, pt was febrile to 102.2F and tachycardic to 120. Initial workup was notable for WBC 13, Na 134, Gluc 414, A1C 13, and AG 20. CT showing SQ inflammation and phlegmon in the left upper back but no abscess. Pt got Clindamycin x1 dose and 2L LR. Pt was admitted to medicine floor for sepsis due to back abscess. Antibiotic was broadened to vanc/zosyn and pt was started on maintenance fluid 125cc/hr. Surgery was consulted, and pt had I&D. Endocrine was consulted for DM management. Pt was started on lantus 20U and humalog 8U TID. Pt will be discharged on Lantus 20U and bydureon 2mg qweek. This is a 34-yo F w/ PMH of T2DM (A1C 13) presented with fever, chills, and a boil in the back. In ED, pt was febrile to 102.2F and tachycardic to 120. Initial workup was notable for WBC 13, Na 134, Gluc 414, A1C 13, and AG 20. CT showing SQ inflammation and phlegmon in the left upper back but no abscess. She was septic with abscess, received IV Clindamycin x1 dose and IVF 2L LR. Pt was admitted to medicine floor for sepsis due to back abscess. Antibiotic was broadened to vanc/zosyn and pt was started on maintenance fluid 125cc/hr. Surgery was consulted, and pt had I&D. Endocrine was consulted for DM management. Pt was started on lantus 20U and humalog 8U TID. Pt will be discharged on Lantus 20U and bydureon 2mg qweek.

## 2019-08-11 NOTE — DISCHARGE NOTE PROVIDER - NSFOLLOWUPCLINICS_GEN_ALL_ED_FT
[facility] Endocrinology  Endocrinology  [address] [place] [address]  Phone: [phone]  Fax:   Follow Up Time:

## 2019-08-11 NOTE — H&P ADULT - PROBLEM SELECTOR PLAN 5
- K 3.6 -> 3.2 in ED. Likely in the setting of IV fluid hydration with component of intracellular shift with - K 3.6 -> 3.2 in ED. Likely dilutional due to IV fluid hydration with a component of intracellular shift with 2u Humalog  - Repleted. Will continue to monitor.

## 2019-08-11 NOTE — CHART NOTE - NSCHARTNOTEFT_GEN_A_CORE
Beryl Navarro PGY-3  MAR  Dept. Internal Medicine    TO BE COMPLETED WITHIN 6 HOURS OF INITIAL ASSESSMENT:    For use in patients that have 2 sepsis criteria and new organ dysfunction   •	New or increased oxygen requirement  •	Creatinine >2mg/dL  •	Bilirubin>2mg/dL  •	Platelet <100,00/mm3  •	INR >1.5, PTT>60  •	Lactate >2    If patient persistent hypotension (SBP<90) or any lactate >4 then provider evaluation (Physician/PA/NP) within 30 minutes of bolus completion is required.    Vital Signs Last 24 Hrs  T(C): 37.3 (11 Aug 2019 07:50), Max: 39 (11 Aug 2019 02:17)  T(F): 99.1 (11 Aug 2019 07:50), Max: 102.2 (11 Aug 2019 02:17)  HR: 102 (11 Aug 2019 07:50) (102 - 120)  BP: 138/86 (11 Aug 2019 07:50) (118/78 - 147/89)  BP(mean): --  RR: 19 (11 Aug 2019 07:50) (16 - 19)  SpO2: 100% (11 Aug 2019 07:50) (96% - 100%)  		  LUNGS:  [  ]Clear bilaterally [  ] Wheeze [  ] Rhonchi [  ] Rales [  ] Crackles; Other:  HEART: [x  ]RRR [  ] No murmur[  ]  Normal S1S2[  ] Tachycardia;  Other:  CAPILLARY REFILL:  	Fingers: [ x ] less than 2 seconds [  ] more than 2 seconds                                           Toes: [ x]  less than 2 seconds [  ] more than 2 seconds   PERIPHERAL PULSES:  Radial: [x  ] Palpable  [  ]  non-palpable                                         Dorsalis Pedis: [x  ] Palpable  [  ] non-palpable                                         Posterior Tibial: [  ] Palpable  [  ] non-palpable                                          Other:  SKIN:   [  ]  Diaphoretic  [  ]  mottling  [  ]  Cold extremities  [x  ]  Warm [ x ]  Dry                      Other:    BEDSIDE ULTRASOUND FINDINGS (IF APPLICABLE):    Labs:  11 Aug 2019 04:41    135    |  97     |  7      ----------------------------<  318    3.2     |  25     |  0.72     Ca    8.2        11 Aug 2019 04:41    TPro  6.8    /  Alb  3.3    /  TBili  0.8    /  DBili  x      /  AST  11     /  ALT  9      /  AlkPhos  79     11 Aug 2019 04:41                          11.8   11.0  )-----------( 225      ( 11 Aug 2019 04:41 )             35.6       Lactate: 1.6    Plan (orders must be placed in EMR):     [  ]  Check Repeat Lactate   [ x ]  No change in current plan  [  ]  Start Vasopressors:  [  ]  Repeat Fluid Bolus:  [  ] other:    Care Discussed with Consultants/Other Providers [ ] YES  [ ] NO Beryl Navarro PGY-3  MAR  Dept. Internal Medicine    TO BE COMPLETED WITHIN 6 HOURS OF INITIAL ASSESSMENT:    For use in patients that have 2 sepsis criteria and new organ dysfunction   •	New or increased oxygen requirement  •	Creatinine >2mg/dL  •	Bilirubin>2mg/dL  •	Platelet <100,00/mm3  •	INR >1.5, PTT>60  •	Lactate >2    If patient persistent hypotension (SBP<90) or any lactate >4 then provider evaluation (Physician/PA/NP) within 30 minutes of bolus completion is required.    Vital Signs Last 24 Hrs  T(C): 37.3 (11 Aug 2019 07:50), Max: 39 (11 Aug 2019 02:17)  T(F): 99.1 (11 Aug 2019 07:50), Max: 102.2 (11 Aug 2019 02:17)  HR: 102 (11 Aug 2019 07:50) (102 - 120)  BP: 138/86 (11 Aug 2019 07:50) (118/78 - 147/89)  BP(mean): --  RR: 19 (11 Aug 2019 07:50) (16 - 19)  SpO2: 100% (11 Aug 2019 07:50) (96% - 100%)  		  LUNGS:  [ x ]Clear bilaterally [  ] Wheeze [  ] Rhonchi [  ] Rales [  ] Crackles; Other:  HEART: [x  ]RRR [  ] No murmur[  ]  Normal S1S2[  ] Tachycardia;  Other:  CAPILLARY REFILL:  	Fingers: [ x ] less than 2 seconds [  ] more than 2 seconds                                           Toes: [ x]  less than 2 seconds [  ] more than 2 seconds   PERIPHERAL PULSES:  Radial: [x  ] Palpable  [  ]  non-palpable                                         Dorsalis Pedis: [x  ] Palpable  [  ] non-palpable                                         Posterior Tibial: [  ] Palpable  [  ] non-palpable                                          Other:  SKIN:   [  ]  Diaphoretic  [  ]  mottling  [  ]  Cold extremities  [x  ]  Warm [ x ]  Dry                      Other:    BEDSIDE ULTRASOUND FINDINGS (IF APPLICABLE):    Labs:  11 Aug 2019 04:41    135    |  97     |  7      ----------------------------<  318    3.2     |  25     |  0.72     Ca    8.2        11 Aug 2019 04:41    TPro  6.8    /  Alb  3.3    /  TBili  0.8    /  DBili  x      /  AST  11     /  ALT  9      /  AlkPhos  79     11 Aug 2019 04:41                          11.8   11.0  )-----------( 225      ( 11 Aug 2019 04:41 )             35.6       Lactate: 1.6    Plan (orders must be placed in EMR):     [  ]  Check Repeat Lactate   [ x ]  No change in current plan  [  ]  Start Vasopressors:  [  ]  Repeat Fluid Bolus:  [  ] other:    Care Discussed with Consultants/Other Providers [ ] YES  [ ] NO

## 2019-08-11 NOTE — DISCHARGE NOTE PROVIDER - NSDCCPCAREPLAN_GEN_ALL_CORE_FT
PRINCIPAL DISCHARGE DIAGNOSIS  Diagnosis: Skin abscess  Assessment and Plan of Treatment: You were diagnosed with sepsis due to an abscess - this means that you had fevers and a high white blood cell count (sign of infection) because you had a collection of infected fluid in your back. You were treated with IV antibiotics (vancomycin and zosyn). The surgery team drained the fluid in your back. Please follow up with General Surgery on ___.      SECONDARY DISCHARGE DIAGNOSES  Diagnosis: Type 2 diabetes mellitus with hyperglycemia, without long-term current use of insulin  Assessment and Plan of Treatment: You presented with high blood sugar levels. Your hemoglobin A1c level was __, which means that your diabetes is poorly controlled at this moment. You were given insulin during your hospital stay. Please follow up with Endocrine Clinic on Sept 9th at ___. PRINCIPAL DISCHARGE DIAGNOSIS  Diagnosis: Skin abscess  Assessment and Plan of Treatment: You were diagnosed with sepsis due to an abscess - this means that you had fevers and a high white blood cell count (sign of infection) because you had a collection of infected fluid in your back. You were treated with IV antibiotics (vancomycin and zosyn). The surgery team drained the fluid in your back. Please change the dressing on your back once a day. Please follow up with General Surgery on August 26th at 10:30AM.      SECONDARY DISCHARGE DIAGNOSES  Diagnosis: Type 2 diabetes mellitus with hyperglycemia, without long-term current use of insulin  Assessment and Plan of Treatment: You presented with high blood sugar levels. You were given insulin during your hospital stay. You will be discharge on Lantus insulin - please inject Please follow up with Endocrine Clinic on Sept 9th at ___. PRINCIPAL DISCHARGE DIAGNOSIS  Diagnosis: Skin abscess  Assessment and Plan of Treatment: You were diagnosed with sepsis due to an abscess - this means that you had fevers and a high white blood cell count (sign of infection) because you had a collection of infected fluid in your back. You were treated with IV antibiotics (vancomycin and zosyn). The surgery team drained the fluid in your back. Please change the dressing on your back once a day and have someone at home change the packing inside and cover with dry sterile gauze and tape every day as well. Please follow up with General Surgery on August 26th at 10:30AM.      SECONDARY DISCHARGE DIAGNOSES  Diagnosis: Type 2 diabetes mellitus with hyperglycemia, without long-term current use of insulin  Assessment and Plan of Treatment: You presented with high blood sugar levels. You were given insulin during your hospital stay. Please take your insulin exactly as prescirbed. Please check your blood sugar in the AM before breakfast, 1 hour after lunch, and before bedtime. Write down these numbers and record them so that they can be reviewed by your endocrinologist and PCP. If you feel lightheaded, weak, dizzy, nauseous, suddenly hungry or thirsty, confused, or otherwise ill check your blood sugar. If low and you have these symptoms, drink juice or other sugary substance and re-check your blood sugar 10-15 minutes after. If your blood sugar is >300, or difficult to control, or you frequently are having low readings, please seek medical attention immediately so that your regimen can be adjusted. Please follow up with Endocrine Clinic on Sept 9th PRINCIPAL DISCHARGE DIAGNOSIS  Diagnosis: Skin abscess  Assessment and Plan of Treatment: You were diagnosed with sepsis due to an abscess - this means that you had fevers and a high white blood cell count (sign of infection) because you had a collection of infected fluid in your back. You were treated with IV antibiotics (vancomycin and zosyn). The surgery team drained the fluid in your back. Please have someone at home change the packing inside and cover with dry sterile gauze and tape every day until your follow appointment with surgery. Please follow up with General Surgery on August 26th at 10:30AM.      SECONDARY DISCHARGE DIAGNOSES  Diagnosis: Type 2 diabetes mellitus with hyperglycemia, without long-term current use of insulin  Assessment and Plan of Treatment: You presented with high blood sugar levels. You were given insulin during your hospital stay. Please take your insulin exactly as prescirbed. Inject Lantus 20 units at night everyday, and inject bydureon 2mg once a week. Please check your blood sugar in the AM before breakfast, 1 hour after lunch, and before bedtime. Write down these numbers and record them so that they can be reviewed by your endocrinologist and PCP. If you feel lightheaded, weak, dizzy, nauseous, suddenly hungry or thirsty, confused, or otherwise ill check your blood sugar. If low and you have these symptoms, drink juice or other sugary substance and re-check your blood sugar 10-15 minutes after. If your blood sugar is >300, or difficult to control, or you frequently are having low readings, please seek medical attention immediately so that your regimen can be adjusted. Please follow up with Endocrine Clinic on Sept 9th.

## 2019-08-11 NOTE — CONSULT NOTE ADULT - PROBLEM SELECTOR RECOMMENDATION 2
-BP elevated, also noted to be tachycardic.    Goal BP<130/80. May be also elevated from infection? Monitor BP  -can check TSH

## 2019-08-11 NOTE — ED CDU PROVIDER SUBSEQUENT DAY NOTE - HISTORY
VICTORINA Cuevas: Patient seen at bedside in NAD.  VSS.  Patient resting comfortably without complaints. no events noted over tele will continue to monitor.

## 2019-08-11 NOTE — ED CDU PROVIDER SUBSEQUENT DAY NOTE - PROGRESS NOTE DETAILS
VICTORINA Cuevas: Patient seen at bedside in NAD.  VSS.  Patient resting comfortably without complaints. no events noted over tele will continue to monitor. VICTORINA Cuevas: patient noted to be febrile 102.2 orally on repeat vital. will give toradol as anti-pyretic. will continue to monitor. pt likely will require admission for persistent fevers. VICTORINA Cuevas: patient remains febrile 100.8 on repeat vitals after toradol. will give Tylenol and reassess. Patient without complaints Pt resting comfortably. NAD. No complaints. VSS. pt with induration to left upper back, will continue to monitor, likely admission d/t uncontrolled DM with cellulitis/abscess.

## 2019-08-11 NOTE — H&P ADULT - PROBLEM SELECTOR PLAN 3
- A1C 13 current presentation  - Endocrinology consult placed. Recs appreciated.  - Will start Lantus 21u QAM and Humalog 7u QAC and mISS with FSG  - Dietitian consult placed. - A1C 13 current presentation  - Endocrinology consult placed. Recs appreciated.  - Will start Lantus 21u QAM and Humalog 7u QAC and mISS with FSG per verbal recs from Endo.  - Dietitian consult placed.  - Patient will need to follow up with Endocrinology outpatient. Patient will also benefit from following up podiatry and ophtho outpatient, however no acute issue for inpatient identified. - A1C 13 current presentation  - Endocrinology consult placed. Recs appreciated.  - Will start Lantus 21u QAM and Humalog 7u QAC and mISS with FSG per verbal recs from Endo.  - Patient was on metformin at some point but with poor adherence. Does not recall when last taken. Patient will need to be on insulin upon discharge given her elevated A1C level.  - Patient will need to follow up with Endocrinology outpatient. Patient will also benefit from following up podiatry and ophtho outpatient, however no acute issue for inpatient identified.  - Dietitian consult placed. - A1C 13 current presentation  - Endocrinology consult placed. Recs appreciated.  - Will start Lantus 21u QAM and Humalog 7u QAC and mISS with FSG per verbal recs from Endo.  - Patient was on metformin at some point but with poor adherence. Does not recall when last taken. Patient will need to be on insulin upon discharge given her elevated A1C level.  - Patient will need to follow up with Endocrinology outpatient. Patient will also benefit from following up podiatry and ophtho outpatient, however no acute issue for inpatient identified.  - Dietitian consult placed.  - To order ABx in NS, not D5W.

## 2019-08-11 NOTE — H&P ADULT - ASSESSMENT
34-yo F w/ PMH of T2DM, presenting with fever, chills, and a boil in the back, admitted for sepsis 2/2 cellulitis in the setting of uncontrolled DM. 34-yo F w/ PMH of T2DM, presenting with fever, chills, and a boil in the back, admitted for sepsis 2/2 cellulitis in the setting of uncontrolled DM, admitted for IV antibiotics. No

## 2019-08-11 NOTE — H&P ADULT - NSHPPHYSICALEXAM_GEN_ALL_CORE
Vital Signs Last 24 Hrs  T(C): 37.3 (11 Aug 2019 07:50), Max: 39 (11 Aug 2019 02:17)  T(F): 99.1 (11 Aug 2019 07:50), Max: 102.2 (11 Aug 2019 02:17)  HR: 102 (11 Aug 2019 07:50) (102 - 120)  BP: 138/86 (11 Aug 2019 07:50) (118/78 - 147/89)  BP(mean): --  RR: 19 (11 Aug 2019 07:50) (16 - 19)  SpO2: 100% (11 Aug 2019 07:50) (96% - 100%)    PHYSICAL EXAM:  GENERAL: NAD, obese, well-groomed, well-developed  HEAD: Atraumatic, Normocephalic  EYES: EOMI, PERRLA, conjunctiva and sclera clear  ENMT: No tonsillar erythema, exudates, or enlargement; Moist mucous membranes, Good dentition  NECK: Supple, No JVD  NERVOUS SYSTEM: AOX3, motor and sensation grossly intact in b/l UE and b/l LE  PSYCHIATRIC: Appropriate affect and mood  CHEST/LUNG: Clear to auscultation bilaterally; No rales, rhonchi, wheezing, or rubs  HEART: Tachycardic, regular rhythm; No murmurs, rubs, or gallops. No LE edema  ABDOMEN: Soft, Nontender, Nondistended; Bowel sounds present  EXTREMITIES:  2+ Peripheral Pulses, No clubbing, cyanosis; capillary refill <2 sec  SKIN: Left upper-mid back under the scapula with raised lesion, covered with intact, dry dressing. Vital Signs Last 24 Hrs  T(C): 37.3 (11 Aug 2019 07:50), Max: 39 (11 Aug 2019 02:17)  T(F): 99.1 (11 Aug 2019 07:50), Max: 102.2 (11 Aug 2019 02:17)  HR: 102 (11 Aug 2019 07:50) (102 - 120)  BP: 138/86 (11 Aug 2019 07:50) (118/78 - 147/89)  BP(mean): --  RR: 19 (11 Aug 2019 07:50) (16 - 19)  SpO2: 100% (11 Aug 2019 07:50) (96% - 100%)    PHYSICAL EXAM:  GENERAL: NAD, obese, well-groomed, well-developed  HEAD: Atraumatic, Normocephalic  EYES: EOMI, PERRLA, conjunctiva and sclera clear  ENMT: No tonsillar erythema, exudates, or enlargement; Moist mucous membranes, Good dentition  NECK: Supple, No JVD  NERVOUS SYSTEM: AOX3, motor and sensation grossly intact in b/l UE and b/l LE  PSYCHIATRIC: Appropriate affect and mood  CHEST/LUNG: Clear to auscultation bilaterally; No rales, rhonchi, wheezing, or rubs  HEART: Tachycardic, regular rhythm; No murmurs, rubs, or gallops. No LE edema  ABDOMEN: Soft, Nontender, Nondistended; Bowel sounds present  EXTREMITIES:  2+ Peripheral Pulses, No clubbing, cyanosis; capillary refill <2 sec  SKIN: Left upper-mid back under the scapula warm, mildly erythematous; self-draining slightly malodorous yellow pus noted, 0.5-cm excoriated scar with erythema next to the drain.

## 2019-08-12 DIAGNOSIS — L02.91 CUTANEOUS ABSCESS, UNSPECIFIED: ICD-10-CM

## 2019-08-12 LAB
ALBUMIN SERPL ELPH-MCNC: 3.1 G/DL — LOW (ref 3.3–5)
ALP SERPL-CCNC: 80 U/L — SIGNIFICANT CHANGE UP (ref 40–120)
ALT FLD-CCNC: 12 U/L — SIGNIFICANT CHANGE UP (ref 10–45)
ANION GAP SERPL CALC-SCNC: 11 MMOL/L — SIGNIFICANT CHANGE UP (ref 5–17)
APPEARANCE UR: ABNORMAL
AST SERPL-CCNC: 17 U/L — SIGNIFICANT CHANGE UP (ref 10–40)
BACTERIA # UR AUTO: NEGATIVE — SIGNIFICANT CHANGE UP
BASOPHILS # BLD AUTO: 0 K/UL — SIGNIFICANT CHANGE UP (ref 0–0.2)
BASOPHILS NFR BLD AUTO: 0.3 % — SIGNIFICANT CHANGE UP (ref 0–2)
BILIRUB SERPL-MCNC: 0.5 MG/DL — SIGNIFICANT CHANGE UP (ref 0.2–1.2)
BILIRUB UR-MCNC: NEGATIVE — SIGNIFICANT CHANGE UP
BUN SERPL-MCNC: 5 MG/DL — LOW (ref 7–23)
CALCIUM SERPL-MCNC: 9 MG/DL — SIGNIFICANT CHANGE UP (ref 8.4–10.5)
CHLORIDE SERPL-SCNC: 97 MMOL/L — SIGNIFICANT CHANGE UP (ref 96–108)
CHOLEST SERPL-MCNC: 115 MG/DL — SIGNIFICANT CHANGE UP (ref 10–199)
CO2 SERPL-SCNC: 25 MMOL/L — SIGNIFICANT CHANGE UP (ref 22–31)
COLOR SPEC: ABNORMAL
CREAT SERPL-MCNC: 0.68 MG/DL — SIGNIFICANT CHANGE UP (ref 0.5–1.3)
DIFF PNL FLD: ABNORMAL
EOSINOPHIL # BLD AUTO: 0.1 K/UL — SIGNIFICANT CHANGE UP (ref 0–0.5)
EOSINOPHIL NFR BLD AUTO: 0.8 % — SIGNIFICANT CHANGE UP (ref 0–6)
EPI CELLS # UR: 5 — SIGNIFICANT CHANGE UP
GLUCOSE BLDC GLUCOMTR-MCNC: 115 MG/DL — HIGH (ref 70–99)
GLUCOSE BLDC GLUCOMTR-MCNC: 159 MG/DL — HIGH (ref 70–99)
GLUCOSE BLDC GLUCOMTR-MCNC: 229 MG/DL — HIGH (ref 70–99)
GLUCOSE BLDC GLUCOMTR-MCNC: 253 MG/DL — HIGH (ref 70–99)
GLUCOSE SERPL-MCNC: 278 MG/DL — HIGH (ref 70–99)
GLUCOSE UR QL: ABNORMAL
HCT VFR BLD CALC: 35.5 % — SIGNIFICANT CHANGE UP (ref 34.5–45)
HDLC SERPL-MCNC: 13 MG/DL — LOW
HGB BLD-MCNC: 11.3 G/DL — LOW (ref 11.5–15.5)
HYALINE CASTS # UR AUTO: 1 /LPF — SIGNIFICANT CHANGE UP (ref 0–7)
KETONES UR-MCNC: ABNORMAL
LEUKOCYTE ESTERASE UR-ACNC: NEGATIVE — SIGNIFICANT CHANGE UP
LIPID PNL WITH DIRECT LDL SERPL: 53 MG/DL — SIGNIFICANT CHANGE UP
LYMPHOCYTES # BLD AUTO: 1.5 K/UL — SIGNIFICANT CHANGE UP (ref 1–3.3)
LYMPHOCYTES # BLD AUTO: 15.1 % — SIGNIFICANT CHANGE UP (ref 13–44)
MAGNESIUM SERPL-MCNC: 1.8 MG/DL — SIGNIFICANT CHANGE UP (ref 1.6–2.6)
MCHC RBC-ENTMCNC: 26.8 PG — LOW (ref 27–34)
MCHC RBC-ENTMCNC: 31.9 GM/DL — LOW (ref 32–36)
MCV RBC AUTO: 84 FL — SIGNIFICANT CHANGE UP (ref 80–100)
MONOCYTES # BLD AUTO: 0.8 K/UL — SIGNIFICANT CHANGE UP (ref 0–0.9)
MONOCYTES NFR BLD AUTO: 8.5 % — SIGNIFICANT CHANGE UP (ref 2–14)
NEUTROPHILS # BLD AUTO: 7.5 K/UL — HIGH (ref 1.8–7.4)
NEUTROPHILS NFR BLD AUTO: 75.2 % — SIGNIFICANT CHANGE UP (ref 43–77)
NITRITE UR-MCNC: NEGATIVE — SIGNIFICANT CHANGE UP
PH UR: 6.5 — SIGNIFICANT CHANGE UP (ref 5–8)
PHOSPHATE SERPL-MCNC: 2.6 MG/DL — SIGNIFICANT CHANGE UP (ref 2.5–4.5)
PLATELET # BLD AUTO: 274 K/UL — SIGNIFICANT CHANGE UP (ref 150–400)
POTASSIUM SERPL-MCNC: 3.6 MMOL/L — SIGNIFICANT CHANGE UP (ref 3.5–5.3)
POTASSIUM SERPL-SCNC: 3.6 MMOL/L — SIGNIFICANT CHANGE UP (ref 3.5–5.3)
PROT SERPL-MCNC: 6.8 G/DL — SIGNIFICANT CHANGE UP (ref 6–8.3)
PROT UR-MCNC: 100 — SIGNIFICANT CHANGE UP
RBC # BLD: 4.23 M/UL — SIGNIFICANT CHANGE UP (ref 3.8–5.2)
RBC # FLD: 14.5 % — SIGNIFICANT CHANGE UP (ref 10.3–14.5)
RBC CASTS # UR COMP ASSIST: 18 /HPF — HIGH (ref 0–4)
SODIUM SERPL-SCNC: 133 MMOL/L — LOW (ref 135–145)
SP GR SPEC: 1.04 — HIGH (ref 1.01–1.02)
TOTAL CHOLESTEROL/HDL RATIO MEASUREMENT: 8.9 RATIO — HIGH (ref 3.3–7.1)
TRIGL SERPL-MCNC: 244 MG/DL — HIGH (ref 10–149)
UROBILINOGEN FLD QL: NEGATIVE — SIGNIFICANT CHANGE UP
WBC # BLD: 10 K/UL — SIGNIFICANT CHANGE UP (ref 3.8–10.5)
WBC # FLD AUTO: 10 K/UL — SIGNIFICANT CHANGE UP (ref 3.8–10.5)
WBC UR QL: 7 /HPF — HIGH (ref 0–5)

## 2019-08-12 PROCEDURE — 99233 SBSQ HOSP IP/OBS HIGH 50: CPT | Mod: GC

## 2019-08-12 PROCEDURE — 99232 SBSQ HOSP IP/OBS MODERATE 35: CPT

## 2019-08-12 RX ORDER — INSULIN LISPRO 100/ML
VIAL (ML) SUBCUTANEOUS
Refills: 0 | Status: DISCONTINUED | OUTPATIENT
Start: 2019-08-12 | End: 2019-08-14

## 2019-08-12 RX ORDER — INSULIN LISPRO 100/ML
VIAL (ML) SUBCUTANEOUS AT BEDTIME
Refills: 0 | Status: DISCONTINUED | OUTPATIENT
Start: 2019-08-12 | End: 2019-08-14

## 2019-08-12 RX ORDER — INSULIN LISPRO 100/ML
8 VIAL (ML) SUBCUTANEOUS
Refills: 0 | Status: DISCONTINUED | OUTPATIENT
Start: 2019-08-12 | End: 2019-08-13

## 2019-08-12 RX ORDER — IBUPROFEN 200 MG
200 TABLET ORAL ONCE
Refills: 0 | Status: COMPLETED | OUTPATIENT
Start: 2019-08-12 | End: 2019-08-12

## 2019-08-12 RX ORDER — INSULIN GLARGINE 100 [IU]/ML
24 INJECTION, SOLUTION SUBCUTANEOUS AT BEDTIME
Refills: 0 | Status: DISCONTINUED | OUTPATIENT
Start: 2019-08-13 | End: 2019-08-13

## 2019-08-12 RX ORDER — LISINOPRIL 2.5 MG/1
5 TABLET ORAL DAILY
Refills: 0 | Status: DISCONTINUED | OUTPATIENT
Start: 2019-08-12 | End: 2019-08-14

## 2019-08-12 RX ORDER — HUMAN INSULIN 100 [IU]/ML
12 INJECTION, SUSPENSION SUBCUTANEOUS ONCE
Refills: 0 | Status: DISCONTINUED | OUTPATIENT
Start: 2019-08-13 | End: 2019-08-14

## 2019-08-12 RX ORDER — KETOROLAC TROMETHAMINE 30 MG/ML
30 SYRINGE (ML) INJECTION ONCE
Refills: 0 | Status: DISCONTINUED | OUTPATIENT
Start: 2019-08-12 | End: 2019-08-12

## 2019-08-12 RX ADMIN — Medication 7 UNIT(S): at 14:16

## 2019-08-12 RX ADMIN — Medication 1: at 17:36

## 2019-08-12 RX ADMIN — PIPERACILLIN AND TAZOBACTAM 25 GRAM(S): 4; .5 INJECTION, POWDER, LYOPHILIZED, FOR SOLUTION INTRAVENOUS at 14:18

## 2019-08-12 RX ADMIN — INSULIN GLARGINE 21 UNIT(S): 100 INJECTION, SOLUTION SUBCUTANEOUS at 09:03

## 2019-08-12 RX ADMIN — Medication 2: at 14:16

## 2019-08-12 RX ADMIN — SODIUM CHLORIDE 3 MILLILITER(S): 9 INJECTION INTRAMUSCULAR; INTRAVENOUS; SUBCUTANEOUS at 06:35

## 2019-08-12 RX ADMIN — ENOXAPARIN SODIUM 40 MILLIGRAM(S): 100 INJECTION SUBCUTANEOUS at 06:28

## 2019-08-12 RX ADMIN — PIPERACILLIN AND TAZOBACTAM 25 GRAM(S): 4; .5 INJECTION, POWDER, LYOPHILIZED, FOR SOLUTION INTRAVENOUS at 22:00

## 2019-08-12 RX ADMIN — Medication 3: at 09:03

## 2019-08-12 RX ADMIN — LISINOPRIL 5 MILLIGRAM(S): 2.5 TABLET ORAL at 17:39

## 2019-08-12 RX ADMIN — SODIUM CHLORIDE 3 MILLILITER(S): 9 INJECTION INTRAMUSCULAR; INTRAVENOUS; SUBCUTANEOUS at 21:52

## 2019-08-12 RX ADMIN — Medication 7 UNIT(S): at 09:04

## 2019-08-12 RX ADMIN — LISINOPRIL 5 MILLIGRAM(S): 2.5 TABLET ORAL at 06:21

## 2019-08-12 RX ADMIN — Medication 200 MILLIGRAM(S): at 20:55

## 2019-08-12 RX ADMIN — Medication 200 MILLIGRAM(S): at 21:20

## 2019-08-12 RX ADMIN — SODIUM CHLORIDE 3 MILLILITER(S): 9 INJECTION INTRAMUSCULAR; INTRAVENOUS; SUBCUTANEOUS at 13:05

## 2019-08-12 RX ADMIN — Medication 250 MILLIGRAM(S): at 06:28

## 2019-08-12 RX ADMIN — ENOXAPARIN SODIUM 40 MILLIGRAM(S): 100 INJECTION SUBCUTANEOUS at 17:38

## 2019-08-12 RX ADMIN — Medication 7 UNIT(S): at 17:36

## 2019-08-12 RX ADMIN — PIPERACILLIN AND TAZOBACTAM 25 GRAM(S): 4; .5 INJECTION, POWDER, LYOPHILIZED, FOR SOLUTION INTRAVENOUS at 06:20

## 2019-08-12 RX ADMIN — Medication 250 MILLIGRAM(S): at 18:42

## 2019-08-12 RX ADMIN — Medication 975 MILLIGRAM(S): at 00:07

## 2019-08-12 NOTE — CONSULT NOTE ADULT - PROBLEM SELECTOR RECOMMENDATION 9
-I&D of abscess (see procedure note)  -Pain control with Tylenol PRN  -Follow up culture  -Continue antibiotics  -erythema marked with a marker  -Discussed with Dr. Dubose

## 2019-08-12 NOTE — CONSULT NOTE ADULT - ATTENDING COMMENTS
I have seen and evaluated the patient and discussed the relevant clinical findings and plan with the surgical housestaff and fellow.  Agree with the above documentation with addenda as noted.       Joshua Dubose MD
Pt with Type 2 diabetes, uncontrolled.  Start basal bolus insulin with lantus 21 units and humalog 7 units before meals as well as low dose correctional scale.  Anticipate discharge on basal bolus insulin.  Pt requires education on glucose monitoring and insulin administration.  Blood pressure has been elevated, consider ACE-inhibitor.  Joanna Fountain MD  on 8/11/19: pager   Other times: Diabetes team: [phone] business hours  [phone] night/weekend

## 2019-08-12 NOTE — PROGRESS NOTE ADULT - PROBLEM SELECTOR PLAN 3
A1C 13  - Endocrinology consulted, recs appreciated.  - Lantus 21u QAM and Humalog 7u TID and mISS with FSG.  - Patient was on metformin at some point but with poor adherence. Patient will need to be on insulin upon discharge given her elevated A1C level.  - Patient will need to follow up with Endocrinology outpatient.   - Dietitian consult placed.

## 2019-08-12 NOTE — PROGRESS NOTE ADULT - PROBLEM SELECTOR PLAN 1
-Change Lantus to QHS dosing as thus:  NPH 12 units sq with b'fast tomw am followed by Lantus 24 units sq qhs  -Humalog 8 units tid-ac  -Moderate scale tid-ac  DISPO: Patient and chart both state that she has HIP but the intern called and said that the patient has no insurance, so will need to clarify tomorrow. If no insurance Humulin 70/30 SQ bid (doses TBD), but if insurance can do Basal +GLP1 agonist.

## 2019-08-12 NOTE — PROGRESS NOTE ADULT - PROBLEM SELECTOR PLAN 1
pt p/w fever, tachycardia, and leukocytosis, in the setting of phlegmon on CT. Negative lactate (1.9).  - c/w vanc/zosyn -> narrow with culture sensitivity  - f/u Bcx, Ucx, wound cx  - S/p 2L LR and currently on NS @ 125.  - S/p clindamycin x1 in ED  - Will continue to monitor with antibiotics and IVF hydration. pt p/w fever, tachycardia, and leukocytosis, in the setting of phlegmon on CT. Negative lactate (1.9).  - c/w vanc/zosyn -> narrow with culture sensitivity  - f/u Bcx, Ucx, wound cx  - S/p 2L LR and currently on NS @ 125.  - S/p clindamycin x1 in ED pt p/w fever, tachycardia, and leukocytosis, in the setting of phlegmon on CT. Negative lactate (1.9).  - surgery consulted for possible I&D  - c/w vanc/zosyn -> narrow with culture sensitivity  - f/u Bcx, Ucx, wound cx  - S/p 2L LR and currently on NS @ 125.  - S/p clindamycin x1 in ED n/a

## 2019-08-12 NOTE — PROGRESS NOTE ADULT - ASSESSMENT
34-yo F w/ PMH of T2DM, presenting with fever, chills, and a boil in the back, admitted for sepsis 2/2 cellulitis in the setting of uncontrolled DM, on vanc/zosyn.

## 2019-08-12 NOTE — CONSULT NOTE ADULT - SUBJECTIVE AND OBJECTIVE BOX
HPI: 34F with poorly controlled DM2 who presented to the ER yesterday with 9 days of upper back pain. Pt reports that it started as a mosquito bite on her back and she scratched it. After 2-3 days she noticed some yellow drainage and had associated fever, chills, and increasing pain. Pt had a CT of her chest to evaluate the back abscess. CT showed phlegmon and inflammatory changes, but not a discrete collection. However, patient noticed that purulent drainage was leaking from her back this morning. No nausea, vomiting, change in bowel movements, SOB, chest pain.     PMH: DM2, HTN  PSH: none  Allergies: NKDA  Medications:  MEDICATIONS  (STANDING):  dextrose 5%. 1000 milliLiter(s) (50 mL/Hr) IV Continuous <Continuous>  dextrose 50% Injectable 12.5 Gram(s) IV Push once  dextrose 50% Injectable 25 Gram(s) IV Push once  dextrose 50% Injectable 25 Gram(s) IV Push once  enoxaparin Injectable 40 milliGRAM(s) SubCutaneous every 12 hours  ibuprofen  Tablet. 200 milliGRAM(s) Oral once  insulin glargine Injectable (LANTUS) 21 Unit(s) SubCutaneous every morning  insulin lispro (HumaLOG) corrective regimen sliding scale   SubCutaneous three times a day before meals  insulin lispro (HumaLOG) corrective regimen sliding scale   SubCutaneous at bedtime  insulin lispro Injectable (HumaLOG) 7 Unit(s) SubCutaneous three times a day before meals  lisinopril 5 milliGRAM(s) Oral daily  piperacillin/tazobactam IVPB.. 3.375 Gram(s) IV Intermittent every 8 hours  sodium chloride 0.9% lock flush 3 milliLiter(s) IV Push every 8 hours  sodium chloride 0.9%. 1000 milliLiter(s) (125 mL/Hr) IV Continuous <Continuous>  vancomycin  IVPB 1000 milliGRAM(s) IV Intermittent every 12 hours    MEDICATIONS  (PRN):  acetaminophen   Tablet .. 975 milliGRAM(s) Oral every 6 hours PRN Temp greater or equal to 38C (100.4F), Mild Pain (1 - 3), Moderate Pain (4 - 6)  dextrose 40% Gel 15 Gram(s) Oral once PRN Blood Glucose LESS THAN 70 milliGRAM(s)/deciliter  glucagon  Injectable 1 milliGRAM(s) IntraMuscular once PRN Glucose LESS THAN 70 milligrams/deciliter    Social: No alcohol, drug, or tobacco use.     Family: non-contributory    Vital Signs Last 24 Hrs  T(C): 36.6 (12 Aug 2019 15:49), Max: 38.7 (11 Aug 2019 21:35)  T(F): 97.9 (12 Aug 2019 15:49), Max: 101.7 (11 Aug 2019 21:35)  HR: 95 (12 Aug 2019 13:26) (86 - 108)  BP: 131/85 (12 Aug 2019 13:26) (131/85 - 145/88)  BP(mean): --  RR: 16 (12 Aug 2019 13:26) (16 - 17)  SpO2: 97% (12 Aug 2019 13:26) (96% - 98%)    General: NAD  Pulm: breathing comfortably  CV: regular rate  Abd: soft, nontender, nondistended  Back: 6 cm area of erythema with fluctuance draining purulent fluid  Extrem: FROM	                          11.3   10.0  )-----------( 274      ( 12 Aug 2019 06:50 )             35.5       08-12    133<L>  |  97  |  5<L>  ----------------------------<  278<H>  3.6   |  25  |  0.68    Ca    9.0      12 Aug 2019 06:46  Phos  2.6     08-12  Mg     1.8     -12    TPro  6.8  /  Alb  3.1<L>  /  TBili  0.5  /  DBili  x   /  AST  17  /  ALT  12  /  AlkPhos  80  08-12        Urinalysis Basic - ( 12 Aug 2019 18:01 )    Color: Dark Yellow / Appearance: Turbid / S.043 / pH: x  Gluc: x / Ketone: Small  / Bili: Negative / Urobili: Negative   Blood: x / Protein: 100 / Nitrite: Negative   Leuk Esterase: Negative / RBC: 18 /hpf / WBC 7 /HPF   Sq Epi: x / Non Sq Epi: 5 / Bacteria: Negative

## 2019-08-12 NOTE — PROGRESS NOTE ADULT - PROBLEM SELECTOR PLAN 5
- K 3.6 -> 3.2 in ED. Likely dilutional due to IV fluid hydration with a component of intracellular shift with 2u Humalog  - Repleted. Will continue to monitor. K 3.6 -> 3.2 in ED. Likely dilutional due to IV fluid hydration with a component of intracellular shift with 2u Humalog  - stable. continue to monitor

## 2019-08-12 NOTE — CONSULT NOTE ADULT - ASSESSMENT
34-yo F w/ PMH of T2DM (A1C 13) presenting with fever, chills, and a boil in the back. Endocrine consulted for DM2 management.
34F with abscess on her upper back.

## 2019-08-12 NOTE — PROGRESS NOTE ADULT - SUBJECTIVE AND OBJECTIVE BOX
Chief Complaint/Follow-up on: T2DM    Subjective: Patient with decreased appetite and po intake. She now understands the importance of taking her medications. She admits to taking her metfromin prn. She does not wish to restart it as both the IR and ER led to diarrhea. She has not taken it in the past 6 months. Her father who was at bedside asked about her taking onglyza as he does.  Pt to have an I&D today.    MEDICATIONS  (STANDING):  dextrose 5%. 1000 milliLiter(s) (50 mL/Hr) IV Continuous <Continuous>  dextrose 50% Injectable 12.5 Gram(s) IV Push once  dextrose 50% Injectable 25 Gram(s) IV Push once  dextrose 50% Injectable 25 Gram(s) IV Push once  enoxaparin Injectable 40 milliGRAM(s) SubCutaneous every 12 hours  ibuprofen  Tablet. 200 milliGRAM(s) Oral once  lisinopril 5 milliGRAM(s) Oral daily  piperacillin/tazobactam IVPB.. 3.375 Gram(s) IV Intermittent every 8 hours  sodium chloride 0.9% lock flush 3 milliLiter(s) IV Push every 8 hours  sodium chloride 0.9%. 1000 milliLiter(s) (125 mL/Hr) IV Continuous <Continuous>  vancomycin  IVPB 1000 milliGRAM(s) IV Intermittent every 12 hours    MEDICATIONS  (PRN):  acetaminophen   Tablet .. 975 milliGRAM(s) Oral every 6 hours PRN Temp greater or equal to 38C (100.4F), Mild Pain (1 - 3), Moderate Pain (4 - 6)  dextrose 40% Gel 15 Gram(s) Oral once PRN Blood Glucose LESS THAN 70 milliGRAM(s)/deciliter  glucagon  Injectable 1 milliGRAM(s) IntraMuscular once PRN Glucose LESS THAN 70 milligrams/deciliter      PHYSICAL EXAM:  VITALS: T(C): 36.6 (08-12-19 @ 15:49)  T(F): 97.9 (08-12-19 @ 15:49), Max: 101.7 (08-11-19 @ 21:35)  HR: 95 (08-12-19 @ 13:26) (86 - 108)  BP: 131/85 (08-12-19 @ 13:26) (131/85 - 145/88)  RR:  (16 - 17)  SpO2:  (96% - 98%)  Wt(kg): --  GENERAL: NAD, well-groomed, well-developed  EYES: No proptosis, no injection  RESPIRATORY: Clear to auscultation bilaterally; No rales, rhonchi, wheezing, or rubs  CARDIOVASCULAR: Regular rate and rhythm; No murmurs  GI: Soft, nontender, non distended, normal bowel sounds      POCT Blood Glucose.: 159 mg/dL (08-12-19 @ 17:14)  POCT Blood Glucose.: 229 mg/dL (08-12-19 @ 13:59)  POCT Blood Glucose.: 253 mg/dL (08-12-19 @ 08:58)  POCT Blood Glucose.: 186 mg/dL (08-11-19 @ 22:18)  POCT Blood Glucose.: 243 mg/dL (08-11-19 @ 19:36)  POCT Blood Glucose.: 215 mg/dL (08-11-19 @ 18:33)  POCT Blood Glucose.: 331 mg/dL (08-11-19 @ 12:56)  POCT Blood Glucose.: 289 mg/dL (08-11-19 @ 08:51)  POCT Blood Glucose.: 321 mg/dL (08-10-19 @ 22:02)    08-12    133<L>  |  97  |  5<L>  ----------------------------<  278<H>  3.6   |  25  |  0.68    EGFR if : 132  EGFR if non : 114    Ca    9.0      08-12  Mg     1.8     08-12  Phos  2.6     08-12    TPro  6.8  /  Alb  3.1<L>  /  TBili  0.5  /  DBili  x   /  AST  17  /  ALT  12  /  AlkPhos  80  08-12        Hemoglobin A1C, Whole Blood: 13.0 % <H> [4.0 - 5.6] (08-11-19 @ 05:36)

## 2019-08-12 NOTE — PROGRESS NOTE ADULT - PROBLEM SELECTOR PLAN 6
Na 134 on presentation. Gluc 414 on BMP. Na corrected to 139, WNL.  - Will continue to monitor. - DVT PPx: Lovenox SQ  - Diet: DASH/TLC/CC  - Full Code

## 2019-08-12 NOTE — PROGRESS NOTE ADULT - SUBJECTIVE AND OBJECTIVE BOX
Patient is a 34y old  Female who presents with a chief complaint of Cellulitis (11 Aug 2019 17:35)      INTERVAL HPI/OVERNIGHT EVENTS:  Overnight pt complained of severe pain, got toradol x1. She was also febrile to 101.7 overnight. This morning ___.     Vital Signs Last 24 Hrs  T(C): 37 (12 Aug 2019 04:36), Max: 38.9 (11 Aug 2019 11:47)  T(F): 98.6 (12 Aug 2019 04:36), Max: 102.1 (11 Aug 2019 11:47)  HR: 86 (12 Aug 2019 04:36) (86 - 114)  BP: 145/88 (12 Aug 2019 04:36) (138/86 - 155/98)  RR: 17 (12 Aug 2019 04:36) (16 - 19)  SpO2: 96% (12 Aug 2019 04:36) (96% - 100%)    PHYSICAL EXAM:  GENERAL: NAD.  CHEST/LUNG: Clear to auscultation; No rales, rhonchi, or wheezing.  Respiratory effort does not appear labored.  HEART: Regular rate and rhythm; S1 and S2,  no murmurs, rubs, or gallops.  ABDOMEN: Soft, not tender to palpation.  No masses or HSM appreciated.  No distension.  Bowel sounds present.  BACK:  EXTREMITIES:  No clubbing, cyanosis, or edema.  Moves all extremities with strength 5/5.  SKIN: No obvious rashes or lesions.  Turgor okay.  NEURO:  Alert and oriented x 3, no focal sensory or  motor deficit, DTR 2+ bilaterally.      LABS:        CAPILLARY BLOOD GLUCOSE      POCT Blood Glucose.: 186 mg/dL (11 Aug 2019 22:18)  POCT Blood Glucose.: 243 mg/dL (11 Aug 2019 19:36)  POCT Blood Glucose.: 215 mg/dL (11 Aug 2019 18:33)  POCT Blood Glucose.: 331 mg/dL (11 Aug 2019 12:56)  POCT Blood Glucose.: 289 mg/dL (11 Aug 2019 08:51)      Culture - Blood (collected 10 Aug 2019 20:46)  Source: .Blood  Preliminary Report (11 Aug 2019 21:01):    No growth to date.    Culture - Blood (collected 10 Aug 2019 20:46)  Source: .Blood  Preliminary Report (11 Aug 2019 21:01):    No growth to date. Patient is a 34y old  Female who presents with a chief complaint of Cellulitis (11 Aug 2019 17:35)      INTERVAL HPI/OVERNIGHT EVENTS:  Overnight pt complained of back pain, got toradol x1. She was also febrile to 101.7 overnight. This morning pt reports feeling well. Denies fever/chills, back pain, chest pain, abdominal pain, SOB, diarrhea/constipation. Last BM was yesterday.      Vital Signs Last 24 Hrs  T(C): 37 (12 Aug 2019 04:36), Max: 38.9 (11 Aug 2019 11:47)  T(F): 98.6 (12 Aug 2019 04:36), Max: 102.1 (11 Aug 2019 11:47)  HR: 86 (12 Aug 2019 04:36) (86 - 114)  BP: 145/88 (12 Aug 2019 04:36) (138/86 - 155/98)  RR: 17 (12 Aug 2019 04:36) (16 - 19)  SpO2: 96% (12 Aug 2019 04:36) (96% - 100%)    PHYSICAL EXAM:  GENERAL: NAD. Comfortable appearing.   HEENT: Atraumatic, NC, MMM.  CHEST/LUNG: Clear to auscultation; No rales, rhonchi, or wheezing.  Respiratory effort does not appear labored.  HEART: Regular rate and rhythm; S1 and S2,  no murmurs, rubs, or gallops.  ABDOMEN: Soft, not tender to palpation.  No masses or HSM appreciated.  No distension.  Bowel sounds present.  BACK: wound with gauze covered. Nontender to touch.   EXTREMITIES:  No clubbing, cyanosis, or edema.  Moves all extremities.  SKIN: No obvious rashes or lesions.  Turgor okay.  NEURO:  Alert and oriented x 3, no focal sensory or  motor deficit    LABS:                        11.3   10.0  )-----------( 274      ( 12 Aug 2019 06:50 )             35.5     WBC: 13->11->10    08-12    133<L>  |  97  |  5<L>  ----------------------------<  278<H>  3.6   |  25  |  0.68    Na corrected: 137    Ca    9.0      12 Aug 2019 06:46  Phos  2.6     08-12  Mg     1.8     08-12    TPro  6.8  /  Alb  3.1<L>  /  TBili  0.5  /  DBili  x   /  AST  17  /  ALT  12  /  AlkPhos  80  08-12          CAPILLARY BLOOD GLUCOSE  POCT Blood Glucose.: 186 mg/dL (11 Aug 2019 22:18)  POCT Blood Glucose.: 243 mg/dL (11 Aug 2019 19:36)  POCT Blood Glucose.: 215 mg/dL (11 Aug 2019 18:33)  POCT Blood Glucose.: 331 mg/dL (11 Aug 2019 12:56)  POCT Blood Glucose.: 289 mg/dL (11 Aug 2019 08:51)      Culture - Blood (collected 10 Aug 2019 20:46)  Source: .Blood  Preliminary Report (11 Aug 2019 21:01):    No growth to date.    Culture - Blood (collected 10 Aug 2019 20:46)  Source: .Blood  Preliminary Report (11 Aug 2019 21:01):    No growth to date. Patient is a 34y old  Female who presents with a chief complaint of Cellulitis (11 Aug 2019 17:35)      INTERVAL HPI/OVERNIGHT EVENTS:  Overnight pt complained of back pain, got toradol x1. She was also febrile to 101.7 overnight. This morning pt reports feeling well. Denies fever/chills, back pain, chest pain, abdominal pain, SOB, diarrhea/constipation. Last BM was yesterday.      Vital Signs Last 24 Hrs  T(C): 37 (12 Aug 2019 04:36), Max: 38.9 (11 Aug 2019 11:47)  T(F): 98.6 (12 Aug 2019 04:36), Max: 102.1 (11 Aug 2019 11:47)  HR: 86 (12 Aug 2019 04:36) (86 - 114)  BP: 145/88 (12 Aug 2019 04:36) (138/86 - 155/98)  RR: 17 (12 Aug 2019 04:36) (16 - 19)  SpO2: 96% (12 Aug 2019 04:36) (96% - 100%)    PHYSICAL EXAM:  GENERAL: NAD. Comfortable appearing.   HEENT: Atraumatic, NC, MMM.  CHEST/LUNG: Clear to auscultation; No rales, rhonchi, or wheezing.  Respiratory effort does not appear labored.  HEART: Regular rate and rhythm; S1 and S2,  no murmurs, rubs, or gallops.  ABDOMEN: Soft, not tender to palpation.  No masses or HSM appreciated.  No distension.  Bowel sounds present.  BACK: wound with purulent discharge draining, gauze covered. Nontender to touch.   EXTREMITIES:  No clubbing, cyanosis, or edema.  Moves all extremities.  SKIN: No obvious rashes or lesions.  Turgor okay.  NEURO:  Alert and oriented x 3, no focal sensory or  motor deficit    LABS:                        11.3   10.0  )-----------( 274      ( 12 Aug 2019 06:50 )             35.5     WBC: 13->11->10    08-12    133<L>  |  97  |  5<L>  ----------------------------<  278<H>  3.6   |  25  |  0.68    Na corrected: 137    Ca    9.0      12 Aug 2019 06:46  Phos  2.6     08-12  Mg     1.8     08-12    TPro  6.8  /  Alb  3.1<L>  /  TBili  0.5  /  DBili  x   /  AST  17  /  ALT  12  /  AlkPhos  80  08-12          CAPILLARY BLOOD GLUCOSE  POCT Blood Glucose.: 186 mg/dL (11 Aug 2019 22:18)  POCT Blood Glucose.: 243 mg/dL (11 Aug 2019 19:36)  POCT Blood Glucose.: 215 mg/dL (11 Aug 2019 18:33)  POCT Blood Glucose.: 331 mg/dL (11 Aug 2019 12:56)  POCT Blood Glucose.: 289 mg/dL (11 Aug 2019 08:51)      Culture - Blood (collected 10 Aug 2019 20:46)  Source: .Blood  Preliminary Report (11 Aug 2019 21:01):    No growth to date.    Culture - Blood (collected 10 Aug 2019 20:46)  Source: .Blood  Preliminary Report (11 Aug 2019 21:01):    No growth to date.

## 2019-08-12 NOTE — PROGRESS NOTE ADULT - PROBLEM SELECTOR PLAN 2
9-day history of skin lesion in the setting of uncontrolled diabetes. Pt presented septic with leukocytosis, fever, and tachycardia.  - antibiotics as above  - F/u BCx and UA.  - f/u wound cx

## 2019-08-12 NOTE — PROGRESS NOTE ADULT - ASSESSMENT
34-yo F w/ PMH of uncontrolled T2DM (HbA1C 13) presenting with fever, chills, and a boil in the back. Endocrine consulted for DM2 management.

## 2019-08-13 DIAGNOSIS — L02.212 CUTANEOUS ABSCESS OF BACK [ANY PART, EXCEPT BUTTOCK]: ICD-10-CM

## 2019-08-13 PROBLEM — E11.8 TYPE 2 DIABETES MELLITUS WITH UNSPECIFIED COMPLICATIONS: Chronic | Status: ACTIVE | Noted: 2019-08-11

## 2019-08-13 PROBLEM — I10 ESSENTIAL (PRIMARY) HYPERTENSION: Chronic | Status: ACTIVE | Noted: 2019-08-10

## 2019-08-13 PROBLEM — Z00.00 ENCOUNTER FOR PREVENTIVE HEALTH EXAMINATION: Status: ACTIVE | Noted: 2019-08-13

## 2019-08-13 LAB
-  AMPICILLIN/SULBACTAM: SIGNIFICANT CHANGE UP
-  CEFAZOLIN: SIGNIFICANT CHANGE UP
-  CLINDAMYCIN: SIGNIFICANT CHANGE UP
-  ERYTHROMYCIN: SIGNIFICANT CHANGE UP
-  GENTAMICIN: SIGNIFICANT CHANGE UP
-  OXACILLIN: SIGNIFICANT CHANGE UP
-  PENICILLIN: SIGNIFICANT CHANGE UP
-  RIFAMPIN: SIGNIFICANT CHANGE UP
-  TETRACYCLINE: SIGNIFICANT CHANGE UP
-  TRIMETHOPRIM/SULFAMETHOXAZOLE: SIGNIFICANT CHANGE UP
-  VANCOMYCIN: SIGNIFICANT CHANGE UP
ANION GAP SERPL CALC-SCNC: 15 MMOL/L — SIGNIFICANT CHANGE UP (ref 5–17)
BASOPHILS # BLD AUTO: 0 K/UL — SIGNIFICANT CHANGE UP (ref 0–0.2)
BASOPHILS NFR BLD AUTO: 0.3 % — SIGNIFICANT CHANGE UP (ref 0–2)
BUN SERPL-MCNC: 4 MG/DL — LOW (ref 7–23)
CALCIUM SERPL-MCNC: 8.8 MG/DL — SIGNIFICANT CHANGE UP (ref 8.4–10.5)
CHLORIDE SERPL-SCNC: 100 MMOL/L — SIGNIFICANT CHANGE UP (ref 96–108)
CO2 SERPL-SCNC: 24 MMOL/L — SIGNIFICANT CHANGE UP (ref 22–31)
CREAT SERPL-MCNC: 0.67 MG/DL — SIGNIFICANT CHANGE UP (ref 0.5–1.3)
CULTURE RESULTS: SIGNIFICANT CHANGE UP
EOSINOPHIL # BLD AUTO: 0.1 K/UL — SIGNIFICANT CHANGE UP (ref 0–0.5)
EOSINOPHIL NFR BLD AUTO: 1.7 % — SIGNIFICANT CHANGE UP (ref 0–6)
GLUCOSE BLDC GLUCOMTR-MCNC: 101 MG/DL — HIGH (ref 70–99)
GLUCOSE BLDC GLUCOMTR-MCNC: 102 MG/DL — HIGH (ref 70–99)
GLUCOSE BLDC GLUCOMTR-MCNC: 132 MG/DL — HIGH (ref 70–99)
GLUCOSE BLDC GLUCOMTR-MCNC: 138 MG/DL — HIGH (ref 70–99)
GLUCOSE SERPL-MCNC: 134 MG/DL — HIGH (ref 70–99)
HCT VFR BLD CALC: 33.1 % — LOW (ref 34.5–45)
HGB BLD-MCNC: 11.2 G/DL — LOW (ref 11.5–15.5)
LYMPHOCYTES # BLD AUTO: 1.4 K/UL — SIGNIFICANT CHANGE UP (ref 1–3.3)
LYMPHOCYTES # BLD AUTO: 16.8 % — SIGNIFICANT CHANGE UP (ref 13–44)
MAGNESIUM SERPL-MCNC: 1.8 MG/DL — SIGNIFICANT CHANGE UP (ref 1.6–2.6)
MCHC RBC-ENTMCNC: 28.4 PG — SIGNIFICANT CHANGE UP (ref 27–34)
MCHC RBC-ENTMCNC: 33.7 GM/DL — SIGNIFICANT CHANGE UP (ref 32–36)
MCV RBC AUTO: 84.3 FL — SIGNIFICANT CHANGE UP (ref 80–100)
METHOD TYPE: SIGNIFICANT CHANGE UP
MONOCYTES # BLD AUTO: 0.8 K/UL — SIGNIFICANT CHANGE UP (ref 0–0.9)
MONOCYTES NFR BLD AUTO: 9.8 % — SIGNIFICANT CHANGE UP (ref 2–14)
NEUTROPHILS # BLD AUTO: 6 K/UL — SIGNIFICANT CHANGE UP (ref 1.8–7.4)
NEUTROPHILS NFR BLD AUTO: 71.4 % — SIGNIFICANT CHANGE UP (ref 43–77)
ORGANISM # SPEC MICROSCOPIC CNT: SIGNIFICANT CHANGE UP
ORGANISM # SPEC MICROSCOPIC CNT: SIGNIFICANT CHANGE UP
PHOSPHATE SERPL-MCNC: 4 MG/DL — SIGNIFICANT CHANGE UP (ref 2.5–4.5)
PLATELET # BLD AUTO: 250 K/UL — SIGNIFICANT CHANGE UP (ref 150–400)
POTASSIUM SERPL-MCNC: 3.2 MMOL/L — LOW (ref 3.5–5.3)
POTASSIUM SERPL-SCNC: 3.2 MMOL/L — LOW (ref 3.5–5.3)
RBC # BLD: 3.93 M/UL — SIGNIFICANT CHANGE UP (ref 3.8–5.2)
RBC # FLD: 14.6 % — HIGH (ref 10.3–14.5)
SODIUM SERPL-SCNC: 139 MMOL/L — SIGNIFICANT CHANGE UP (ref 135–145)
SPECIMEN SOURCE: SIGNIFICANT CHANGE UP
TSH SERPL-MCNC: 2.29 UIU/ML — SIGNIFICANT CHANGE UP (ref 0.27–4.2)
VANCOMYCIN TROUGH SERPL-MCNC: 5.8 UG/ML — LOW (ref 10–20)
WBC # BLD: 8.4 K/UL — SIGNIFICANT CHANGE UP (ref 3.8–10.5)
WBC # FLD AUTO: 8.4 K/UL — SIGNIFICANT CHANGE UP (ref 3.8–10.5)

## 2019-08-13 PROCEDURE — 99232 SBSQ HOSP IP/OBS MODERATE 35: CPT

## 2019-08-13 PROCEDURE — 99233 SBSQ HOSP IP/OBS HIGH 50: CPT

## 2019-08-13 RX ORDER — ENOXAPARIN SODIUM 100 MG/ML
24 INJECTION SUBCUTANEOUS
Qty: 9 | Refills: 0
Start: 2019-08-13 | End: 2019-09-11

## 2019-08-13 RX ORDER — ISOPROPYL ALCOHOL, BENZOCAINE .7; .06 ML/ML; ML/ML
1 SWAB TOPICAL
Qty: 100 | Refills: 1
Start: 2019-08-13 | End: 2019-10-01

## 2019-08-13 RX ORDER — INSULIN GLARGINE 100 [IU]/ML
24 INJECTION, SOLUTION SUBCUTANEOUS
Qty: 9 | Refills: 0
Start: 2019-08-13 | End: 2019-09-11

## 2019-08-13 RX ORDER — DULAGLUTIDE 4.5 MG/.5ML
0.75 INJECTION, SOLUTION SUBCUTANEOUS
Qty: 3 | Refills: 0
Start: 2019-08-13 | End: 2019-09-11

## 2019-08-13 RX ORDER — PIPERACILLIN AND TAZOBACTAM 4; .5 G/20ML; G/20ML
3.38 INJECTION, POWDER, LYOPHILIZED, FOR SOLUTION INTRAVENOUS EVERY 8 HOURS
Refills: 0 | Status: DISCONTINUED | OUTPATIENT
Start: 2019-08-13 | End: 2019-08-14

## 2019-08-13 RX ORDER — POTASSIUM CHLORIDE 20 MEQ
40 PACKET (EA) ORAL ONCE
Refills: 0 | Status: COMPLETED | OUTPATIENT
Start: 2019-08-13 | End: 2019-08-13

## 2019-08-13 RX ORDER — LISINOPRIL 2.5 MG/1
1 TABLET ORAL
Qty: 30 | Refills: 0
Start: 2019-08-13 | End: 2019-09-11

## 2019-08-13 RX ORDER — INSULIN LISPRO 100/ML
6 VIAL (ML) SUBCUTANEOUS
Refills: 0 | Status: DISCONTINUED | OUTPATIENT
Start: 2019-08-13 | End: 2019-08-14

## 2019-08-13 RX ORDER — VANCOMYCIN HCL 1 G
1250 VIAL (EA) INTRAVENOUS EVERY 8 HOURS
Refills: 0 | Status: DISCONTINUED | OUTPATIENT
Start: 2019-08-13 | End: 2019-08-14

## 2019-08-13 RX ORDER — VANCOMYCIN HCL 1 G
1000 VIAL (EA) INTRAVENOUS EVERY 12 HOURS
Refills: 0 | Status: DISCONTINUED | OUTPATIENT
Start: 2019-08-13 | End: 2019-08-13

## 2019-08-13 RX ORDER — INSULIN GLARGINE 100 [IU]/ML
20 INJECTION, SOLUTION SUBCUTANEOUS AT BEDTIME
Refills: 0 | Status: DISCONTINUED | OUTPATIENT
Start: 2019-08-13 | End: 2019-08-14

## 2019-08-13 RX ADMIN — SODIUM CHLORIDE 3 MILLILITER(S): 9 INJECTION INTRAMUSCULAR; INTRAVENOUS; SUBCUTANEOUS at 22:15

## 2019-08-13 RX ADMIN — Medication 975 MILLIGRAM(S): at 18:37

## 2019-08-13 RX ADMIN — PIPERACILLIN AND TAZOBACTAM 25 GRAM(S): 4; .5 INJECTION, POWDER, LYOPHILIZED, FOR SOLUTION INTRAVENOUS at 15:03

## 2019-08-13 RX ADMIN — SODIUM CHLORIDE 3 MILLILITER(S): 9 INJECTION INTRAMUSCULAR; INTRAVENOUS; SUBCUTANEOUS at 05:48

## 2019-08-13 RX ADMIN — ENOXAPARIN SODIUM 40 MILLIGRAM(S): 100 INJECTION SUBCUTANEOUS at 18:38

## 2019-08-13 RX ADMIN — Medication 975 MILLIGRAM(S): at 18:52

## 2019-08-13 RX ADMIN — ENOXAPARIN SODIUM 40 MILLIGRAM(S): 100 INJECTION SUBCUTANEOUS at 05:47

## 2019-08-13 RX ADMIN — Medication 250 MILLIGRAM(S): at 05:47

## 2019-08-13 RX ADMIN — Medication 8 UNIT(S): at 18:36

## 2019-08-13 RX ADMIN — PIPERACILLIN AND TAZOBACTAM 25 GRAM(S): 4; .5 INJECTION, POWDER, LYOPHILIZED, FOR SOLUTION INTRAVENOUS at 23:57

## 2019-08-13 RX ADMIN — Medication 975 MILLIGRAM(S): at 05:48

## 2019-08-13 RX ADMIN — Medication 40 MILLIEQUIVALENT(S): at 09:46

## 2019-08-13 RX ADMIN — INSULIN GLARGINE 20 UNIT(S): 100 INJECTION, SOLUTION SUBCUTANEOUS at 22:22

## 2019-08-13 RX ADMIN — Medication 8 UNIT(S): at 08:48

## 2019-08-13 RX ADMIN — PIPERACILLIN AND TAZOBACTAM 25 GRAM(S): 4; .5 INJECTION, POWDER, LYOPHILIZED, FOR SOLUTION INTRAVENOUS at 05:47

## 2019-08-13 RX ADMIN — Medication 166.67 MILLIGRAM(S): at 22:10

## 2019-08-13 RX ADMIN — Medication 8 UNIT(S): at 14:04

## 2019-08-13 RX ADMIN — LISINOPRIL 5 MILLIGRAM(S): 2.5 TABLET ORAL at 05:47

## 2019-08-13 RX ADMIN — SODIUM CHLORIDE 3 MILLILITER(S): 9 INJECTION INTRAMUSCULAR; INTRAVENOUS; SUBCUTANEOUS at 15:06

## 2019-08-13 NOTE — DIETITIAN INITIAL EVALUATION ADULT. - PERTINENT LABORATORY DATA
CAPILLARY BLOOD GLUCOSE      POCT Blood Glucose.: 138 mg/dL (13 Aug 2019 08:45)  POCT Blood Glucose.: 115 mg/dL (12 Aug 2019 21:49)  POCT Blood Glucose.: 159 mg/dL (12 Aug 2019 17:14)  POCT Blood Glucose.: 229 mg/dL (12 Aug 2019 13:59)

## 2019-08-13 NOTE — PROGRESS NOTE ADULT - SUBJECTIVE AND OBJECTIVE BOX
Patient is a 34y old  Female who presents with a chief complaint of Cellulitis (12 Aug 2019 19:38)      INTERVAL HPI/OVERNIGHT EVENTS:  Pt had I&D of wound yesterday by surgery.     MEDICATIONS  (STANDING):  dextrose 5%. 1000 milliLiter(s) (50 mL/Hr) IV Continuous <Continuous>  dextrose 50% Injectable 12.5 Gram(s) IV Push once  dextrose 50% Injectable 25 Gram(s) IV Push once  dextrose 50% Injectable 25 Gram(s) IV Push once  enoxaparin Injectable 40 milliGRAM(s) SubCutaneous every 12 hours  insulin glargine Injectable (LANTUS) 24 Unit(s) SubCutaneous at bedtime  insulin lispro (HumaLOG) corrective regimen sliding scale   SubCutaneous three times a day before meals  insulin lispro (HumaLOG) corrective regimen sliding scale   SubCutaneous at bedtime  insulin lispro Injectable (HumaLOG) 8 Unit(s) SubCutaneous three times a day before meals  insulin NPH human recombinant 12 Unit(s) SubCutaneous once  lisinopril 5 milliGRAM(s) Oral daily  piperacillin/tazobactam IVPB.. 3.375 Gram(s) IV Intermittent every 8 hours  sodium chloride 0.9% lock flush 3 milliLiter(s) IV Push every 8 hours  sodium chloride 0.9%. 1000 milliLiter(s) (125 mL/Hr) IV Continuous <Continuous>  vancomycin  IVPB 1000 milliGRAM(s) IV Intermittent every 12 hours    MEDICATIONS  (PRN):  acetaminophen   Tablet .. 975 milliGRAM(s) Oral every 6 hours PRN Temp greater or equal to 38C (100.4F), Mild Pain (1 - 3), Moderate Pain (4 - 6)  dextrose 40% Gel 15 Gram(s) Oral once PRN Blood Glucose LESS THAN 70 milliGRAM(s)/deciliter  glucagon  Injectable 1 milliGRAM(s) IntraMuscular once PRN Glucose LESS THAN 70 milligrams/deciliter      Allergies    No Known Allergies    Intolerances        Vital Signs Last 24 Hrs  T(C): 36.8 (13 Aug 2019 04:18), Max: 38.5 (12 Aug 2019 13:26)  T(F): 98.2 (13 Aug 2019 04:18), Max: 101.3 (12 Aug 2019 13:26)  HR: 86 (13 Aug 2019 04:18) (86 - 95)  BP: 129/86 (13 Aug 2019 04:18) (126/83 - 131/85)  BP(mean): --  RR: 18 (13 Aug 2019 04:18) (16 - 18)  SpO2: 100% (13 Aug 2019 04:18) (97% - 100%)    PHYSICAL EXAM:  GENERAL: NAD.  CHEST/LUNG: Clear to auscultation; No rales, rhonchi, or wheezing.  Respiratory effort does not appear labored.  HEART: Regular rate and rhythm; S1 and S2,  no murmurs, rubs, or gallops.  ABDOMEN: Soft, not tender to palpation.  No masses or HSM appreciated.  No distension.  Bowel sounds present.  EXTREMITIES:  No clubbing, cyanosis, or edema.  Moves all extremities with strength 5/5.  SKIN: No obvious rashes or lesions.  Turgor okay.  NEURO:  Alert and oriented x 3, no focal sensory or  motor deficit, DTR 2+ bilaterally.    LABS:                        11.3   10.0  )-----------( 274      ( 12 Aug 2019 06:50 )             35.5     08-12    133<L>  |  97  |  5<L>  ----------------------------<  278<H>  3.6   |  25  |  0.68    Ca    9.0      12 Aug 2019 06:46  Phos  2.6     -12  Mg     1.8     12    TPro  6.8  /  Alb  3.1<L>  /  TBili  0.5  /  DBili  x   /  AST  17  /  ALT  12  /  AlkPhos  80  08-12      Urinalysis Basic - ( 12 Aug 2019 18:01 )    Color: Dark Yellow / Appearance: Turbid / S.043 / pH: x  Gluc: x / Ketone: Small  / Bili: Negative / Urobili: Negative   Blood: x / Protein: 100 / Nitrite: Negative   Leuk Esterase: Negative / RBC: 18 /hpf / WBC 7 /HPF   Sq Epi: x / Non Sq Epi: 5 / Bacteria: Negative      CAPILLARY BLOOD GLUCOSE      POCT Blood Glucose.: 115 mg/dL (12 Aug 2019 21:49)  POCT Blood Glucose.: 159 mg/dL (12 Aug 2019 17:14)  POCT Blood Glucose.: 229 mg/dL (12 Aug 2019 13:59)  POCT Blood Glucose.: 253 mg/dL (12 Aug 2019 08:58)      Culture - Other (collected 11 Aug 2019 18:08)  Source: Skin  Preliminary Report (12 Aug 2019 18:14):    Few Staphylococcus aureus    Culture - Blood (collected 10 Aug 2019 20:46)  Source: .Blood  Preliminary Report (11 Aug 2019 21:01):    No growth to date.    Culture - Blood (collected 10 Aug 2019 20:46)  Source: .Blood  Preliminary Report (11 Aug 2019 21:01):    No growth to date. Patient is a 34y old  Female who presents with a chief complaint of Cellulitis (12 Aug 2019 19:38)      INTERVAL HPI/OVERNIGHT EVENTS:  Pt had I&D of wound yesterday by surgery. Also seen by Endocrine, with recommendation changing to NPH 12U this morning, followed by Lantus 24 qhs, humalog 8U TID.     MEDICATIONS  (STANDING):  dextrose 5%. 1000 milliLiter(s) (50 mL/Hr) IV Continuous <Continuous>  dextrose 50% Injectable 12.5 Gram(s) IV Push once  dextrose 50% Injectable 25 Gram(s) IV Push once  dextrose 50% Injectable 25 Gram(s) IV Push once  enoxaparin Injectable 40 milliGRAM(s) SubCutaneous every 12 hours  insulin glargine Injectable (LANTUS) 24 Unit(s) SubCutaneous at bedtime  insulin lispro (HumaLOG) corrective regimen sliding scale   SubCutaneous three times a day before meals  insulin lispro (HumaLOG) corrective regimen sliding scale   SubCutaneous at bedtime  insulin lispro Injectable (HumaLOG) 8 Unit(s) SubCutaneous three times a day before meals  insulin NPH human recombinant 12 Unit(s) SubCutaneous once  lisinopril 5 milliGRAM(s) Oral daily  piperacillin/tazobactam IVPB.. 3.375 Gram(s) IV Intermittent every 8 hours  sodium chloride 0.9% lock flush 3 milliLiter(s) IV Push every 8 hours  sodium chloride 0.9%. 1000 milliLiter(s) (125 mL/Hr) IV Continuous <Continuous>  vancomycin  IVPB 1000 milliGRAM(s) IV Intermittent every 12 hours    MEDICATIONS  (PRN):  acetaminophen   Tablet .. 975 milliGRAM(s) Oral every 6 hours PRN Temp greater or equal to 38C (100.4F), Mild Pain (1 - 3), Moderate Pain (4 - 6)  dextrose 40% Gel 15 Gram(s) Oral once PRN Blood Glucose LESS THAN 70 milliGRAM(s)/deciliter  glucagon  Injectable 1 milliGRAM(s) IntraMuscular once PRN Glucose LESS THAN 70 milligrams/deciliter      Allergies    No Known Allergies    Intolerances        Vital Signs Last 24 Hrs  T(C): 36.8 (13 Aug 2019 04:18), Max: 38.5 (12 Aug 2019 13:26)  T(F): 98.2 (13 Aug 2019 04:18), Max: 101.3 (12 Aug 2019 13:26)  HR: 86 (13 Aug 2019 04:18) (86 - 95)  BP: 129/86 (13 Aug 2019 04:18) (126/83 - 131/85)  BP(mean): --  RR: 18 (13 Aug 2019 04:18) (16 - 18)  SpO2: 100% (13 Aug 2019 04:18) (97% - 100%)    PHYSICAL EXAM:  GENERAL: NAD.  CHEST/LUNG: Clear to auscultation; No rales, rhonchi, or wheezing.  Respiratory effort does not appear labored.  HEART: Regular rate and rhythm; S1 and S2,  no murmurs, rubs, or gallops.  ABDOMEN: Soft, not tender to palpation.  No masses or HSM appreciated.  No distension.  Bowel sounds present.  EXTREMITIES:  No clubbing, cyanosis, or edema.  Moves all extremities with strength 5/5.  SKIN: No obvious rashes or lesions.  Turgor okay.  NEURO:  Alert and oriented x 3, no focal sensory or  motor deficit, DTR 2+ bilaterally.    LABS:                        11.3   10.0  )-----------( 274      ( 12 Aug 2019 06:50 )             35.5     08-12    133<L>  |  97  |  5<L>  ----------------------------<  278<H>  3.6   |  25  |  0.68    Ca    9.0      12 Aug 2019 06:46  Phos  2.6     08-12  Mg     1.8     08-12    TPro  6.8  /  Alb  3.1<L>  /  TBili  0.5  /  DBili  x   /  AST  17  /  ALT  12  /  AlkPhos  80  08-12      Urinalysis Basic - ( 12 Aug 2019 18:01 )    Color: Dark Yellow / Appearance: Turbid / S.043 / pH: x  Gluc: x / Ketone: Small  / Bili: Negative / Urobili: Negative   Blood: x / Protein: 100 / Nitrite: Negative   Leuk Esterase: Negative / RBC: 18 /hpf / WBC 7 /HPF   Sq Epi: x / Non Sq Epi: 5 / Bacteria: Negative      CAPILLARY BLOOD GLUCOSE      POCT Blood Glucose.: 115 mg/dL (12 Aug 2019 21:49)  POCT Blood Glucose.: 159 mg/dL (12 Aug 2019 17:14)  POCT Blood Glucose.: 229 mg/dL (12 Aug 2019 13:59)  POCT Blood Glucose.: 253 mg/dL (12 Aug 2019 08:58)      Culture - Other (collected 11 Aug 2019 18:08)  Source: Skin  Preliminary Report (12 Aug 2019 18:14):    Few Staphylococcus aureus    Culture - Blood (collected 10 Aug 2019 20:46)  Source: .Blood  Preliminary Report (11 Aug 2019 21:01):    No growth to date.    Culture - Blood (collected 10 Aug 2019 20:46)  Source: .Blood  Preliminary Report (11 Aug 2019 21:01):    No growth to date. Patient is a 34y old  Female who presents with a chief complaint of Cellulitis (12 Aug 2019 19:38)      INTERVAL HPI/OVERNIGHT EVENTS:  Pt had I&D of wound yesterday by surgery. Also seen by Endocrine, with recommendation changing to NPH 12U this morning, followed by Lantus 24 qhs, humalog 8U TID. Overnight pt was afebrile.       Vital Signs Last 24 Hrs  T(C): 36.8 (13 Aug 2019 04:18), Max: 38.5 (12 Aug 2019 13:26)  T(F): 98.2 (13 Aug 2019 04:18), Max: 101.3 (12 Aug 2019 13:26)  HR: 86 (13 Aug 2019 04:18) (86 - 95)  BP: 129/86 (13 Aug 2019 04:18) (126/83 - 131/85)  RR: 18 (13 Aug 2019 04:18) (16 - 18)  SpO2: 100% (13 Aug 2019 04:18) (97% - 100%)    PHYSICAL EXAM:  GENERAL: NAD. Comfortable appearing. Pleasant, cooperative.   CHEST/LUNG: Clear to auscultation; No rales, rhonchi, or wheezing.  Respiratory effort does not appear labored.  HEART: Regular rate and rhythm; S1 and S2,  no murmurs, rubs, or gallops.  ABDOMEN: Soft, not tender to palpation.  No masses or HSM appreciated.  No distension.  Bowel sounds present.  EXTREMITIES:  No clubbing, cyanosis, or edema.  Moves all extremities with strength 5/5.  SKIN: No obvious rashes or lesions.  Turgor okay.  NEURO:  Alert and oriented x 3, no focal sensory or  motor deficit    LABS:               cholesterol 115   (H)  HDL 13 (L)  LDL 53    POCT Blood Glucose.: 115 mg/dL (12 Aug 2019 21:49)  POCT Blood Glucose.: 159 mg/dL (12 Aug 2019 17:14)  POCT Blood Glucose.: 229 mg/dL (12 Aug 2019 13:59)  POCT Blood Glucose.: 253 mg/dL (12 Aug 2019 08:58)      Culture - Other (collected 11 Aug 2019 18:08)  Source: Skin  Preliminary Report (12 Aug 2019 18:14):    Few Staphylococcus aureus    Culture - Blood (collected 10 Aug 2019 20:46)  Source: .Blood  Preliminary Report (11 Aug 2019 21:01):    No growth to date.    Culture - Blood (collected 10 Aug 2019 20:46)  Source: .Blood  Preliminary Report (11 Aug 2019 21:01):    No growth to date. Patient is a 34y old  Female who presents with a chief complaint of Cellulitis (12 Aug 2019 19:38)      INTERVAL HPI/OVERNIGHT EVENTS:  Pt had I&D of wound yesterday by surgery. Also seen by Endocrine, with recommendation changing to NPH 12U this morning, followed by Lantus 24 qhs, humalog 8U TID. Overnight pt was afebrile. This morning pt reports feeling well. Denies fever/chills, back pain, SOB, diarrhea.       Vital Signs Last 24 Hrs  T(C): 36.8 (13 Aug 2019 04:18), Max: 38.5 (12 Aug 2019 13:26)  T(F): 98.2 (13 Aug 2019 04:18), Max: 101.3 (12 Aug 2019 13:26)  HR: 86 (13 Aug 2019 04:18) (86 - 95)  BP: 129/86 (13 Aug 2019 04:18) (126/83 - 131/85)  RR: 18 (13 Aug 2019 04:18) (16 - 18)  SpO2: 100% (13 Aug 2019 04:18) (97% - 100%)    PHYSICAL EXAM:  GENERAL: NAD. Comfortable appearing. Pleasant, cooperative.   CHEST/LUNG: Clear to auscultation; No rales, rhonchi, or wheezing.  Respiratory effort does not appear labored.  HEART: Regular rate and rhythm; S1 and S2,  no murmurs, rubs, or gallops.  ABDOMEN: Soft, not tender to palpation.  No masses or HSM appreciated.  No distension.  Bowel sounds present.  BACK: I&D incision, dry without blood. no purulent discharge. Nonerythematous.   EXTREMITIES:  No clubbing, cyanosis, or edema.  Moves all extremities   NEURO:  Alert and oriented x 3, no focal sensory or  motor deficit    LABS:                        11.2   8.4   )-----------( 250      ( 13 Aug 2019 07:05 )             33.1     WBC: 10.0 -> 8.4           cholesterol 115   (H)  HDL 13 (L)  LDL 53    POCT Blood Glucose.: 115 mg/dL (12 Aug 2019 21:49)  POCT Blood Glucose.: 159 mg/dL (12 Aug 2019 17:14)  POCT Blood Glucose.: 229 mg/dL (12 Aug 2019 13:59)  POCT Blood Glucose.: 253 mg/dL (12 Aug 2019 08:58)      Culture - Other (collected 11 Aug 2019 18:08)  Source: Skin  Preliminary Report (12 Aug 2019 18:14):    Few Staphylococcus aureus    Culture - Blood (collected 10 Aug 2019 20:46)  Source: .Blood  Preliminary Report (11 Aug 2019 21:01):    No growth to date.    Culture - Blood (collected 10 Aug 2019 20:46)  Source: .Blood  Preliminary Report (11 Aug 2019 21:01):    No growth to date. Patient is a 34y old  Female who presents with a chief complaint of Cellulitis (12 Aug 2019 19:38)      INTERVAL HPI/OVERNIGHT EVENTS:  Pt had I&D of wound yesterday by surgery. Also seen by Endocrine, with recommendation changing to NPH 12U this morning, followed by Lantus 24 qhs, humalog 8U TID. Overnight pt was afebrile. This morning pt reports feeling well. Denies fever/chills, back pain, SOB, diarrhea.       Vital Signs Last 24 Hrs  T(C): 36.8 (13 Aug 2019 04:18), Max: 38.5 (12 Aug 2019 13:26)  T(F): 98.2 (13 Aug 2019 04:18), Max: 101.3 (12 Aug 2019 13:26)  HR: 86 (13 Aug 2019 04:18) (86 - 95)  BP: 129/86 (13 Aug 2019 04:18) (126/83 - 131/85)  RR: 18 (13 Aug 2019 04:18) (16 - 18)  SpO2: 100% (13 Aug 2019 04:18) (97% - 100%)    PHYSICAL EXAM:  GENERAL: NAD. Comfortable appearing. Pleasant, cooperative.   CHEST/LUNG: Clear to auscultation; No rales, rhonchi, or wheezing.  Respiratory effort does not appear labored.  HEART: Regular rate and rhythm; S1 and S2,  no murmurs, rubs, or gallops.  ABDOMEN: Soft, not tender to palpation.  No masses or HSM appreciated.  No distension.  Bowel sounds present.  BACK: I&D incision, dry without blood. no purulent discharge. Nonerythematous.   EXTREMITIES:  No clubbing, cyanosis, or edema.  Moves all extremities   NEURO:  Alert and oriented x 3, no focal sensory or  motor deficit    LABS:                        11.2   8.4   )-----------( 250      ( 13 Aug 2019 07:05 )             33.1     WBC: 10.0 -> 8.4       08-13    139  |  100  |  4<L>  ----------------------------<  134<H>  3.2<L>   |  24  |  0.67    K: repleted  Ca    8.8      13 Aug 2019 07:06  Phos  4.0     08-13  Mg     1.8     08-13    cholesterol 115   (H)  HDL 13 (L)  LDL 53    POCT Blood Glucose.: 115 mg/dL (12 Aug 2019 21:49)  POCT Blood Glucose.: 159 mg/dL (12 Aug 2019 17:14)  POCT Blood Glucose.: 229 mg/dL (12 Aug 2019 13:59)  POCT Blood Glucose.: 253 mg/dL (12 Aug 2019 08:58)      Culture - Other (collected 11 Aug 2019 18:08)  Source: Skin  Preliminary Report (12 Aug 2019 18:14):    Few Staphylococcus aureus    Culture - Blood (collected 10 Aug 2019 20:46)  Source: .Blood  Preliminary Report (11 Aug 2019 21:01):    No growth to date.    Culture - Blood (collected 10 Aug 2019 20:46)  Source: .Blood  Preliminary Report (11 Aug 2019 21:01):    No growth to date.

## 2019-08-13 NOTE — PROGRESS NOTE ADULT - PROBLEM SELECTOR PLAN 1
-s/p NPH 12 units sq this am. Patient's bs are getting better so will adjust her insulin as this: Lantus 20 units sq qhs and Humalog 6 units tid-ac  -Moderate scale tid-ac  DISPO: Spoke with intern and case mgt, the patient's HIP plan does not cover her scripts for insulin or testing supplies, asked them to see if maybe she needs a durable goods supplier like [employer]. The patient states that she has [employer], which usually is mail order but they often give an ok to an initial one month at a pharmacy. -s/p NPH 12 units sq this am. Patient's bs are getting better so will adjust her insulin as this: Lantus 20 units sq qhs and Humalog 6 units tid-ac  -Moderate scale tid-ac  DISPO: Spoke with intern and case mgt, the patient's HIP plan does not cover her scripts for insulin or testing supplies, asked them to see if maybe she needs a durable goods supplier like [employer]. The patient states that she has [employer], which usually is mail order but they often give an ok to an initial one month at a pharmacy.  Discharge on basal + GLP-1 agonist with meter and testing supplies to test BID.  F/u appt 9/9 at 430 PM with diabetes educator and 11/14/19 at 215PM with Dr. Adalgisa Sargent. Both at [address] [facility], [address].

## 2019-08-13 NOTE — PROGRESS NOTE ADULT - ASSESSMENT
34-yo F w/ PMH of T2DM and HTN, presenting with fever, chills, and a boil in the back, admitted for sepsis 2/2 cellulitis of upper back with uncontrolled DM, on vanc/zosyn, s/p I&D (8/12). 34-yo F w/ PMH of T2DM and HTN, presenting with fever, chills, and a boil in the back, admitted for sepsis 2/2 abscess of upper back with uncontrolled DM, on vanc/zosyn, s/p I&D (8/12).

## 2019-08-13 NOTE — DIETITIAN INITIAL EVALUATION ADULT. - PROBLEM SELECTOR PLAN 5
- K 3.6 -> 3.2 in ED. Likely dilutional due to IV fluid hydration with a component of intracellular shift with 2u Humalog  - Repleted. Will continue to monitor.

## 2019-08-13 NOTE — CHART NOTE - NSCHARTNOTEFT_GEN_A_CORE
Called by RN due to low vanc trough of 5.8. Patient was receiving 1g q12. Spoke to pharmacy and increased dose to 1250 q8. Informed RN.

## 2019-08-13 NOTE — DIETITIAN INITIAL EVALUATION ADULT. - PROBLEM SELECTOR PLAN 4
- Patient with history of HTN. SBP 140s noted in ED.  - Will need renal protection in light of uncontrolled DM  - Patient was on some BP regimen, however patient does not recall names of meds. Pharmacy does not retain record.  - SBP 140s in ED. Tolerable BP at this time. Will initiate ACEI during current admission.

## 2019-08-13 NOTE — PROGRESS NOTE ADULT - PROBLEM SELECTOR PLAN 1
pt p/w fever, tachycardia, and leukocytosis, in the setting of phlegmon on CT. Negative lactate (1.9). Resolve leukocytosis, afebrile overnight. I&D 8/12  - c/w vanc/zosyn -> narrow with culture sensitivity  - f/u Bcx, Ucx, culture from I&D  - S/p 2L LR and currently on NS @ 125.  - S/p clindamycin x1 in ED  - s/p I&D 8/12 pt p/w sepsis (fever, tachycardia, and leukocytosis) in the setting of phlegmon on CT. Negative lactate (1.9). Resolve leukocytosis, afebrile overnight. I&D 8/12  - c/w vanc/zosyn -> narrow with culture sensitivity  - f/u Bcx, Ucx, culture from I&D  - S/p 2L LR   - S/p clindamycin x1 in ED  - s/p I&D 8/12  - pt to follow up with surgery in 1-2 weeks

## 2019-08-13 NOTE — DIETITIAN INITIAL EVALUATION ADULT. - PERTINENT MEDS FT
MEDICATIONS  (STANDING):  dextrose 5%. 1000 milliLiter(s) (50 mL/Hr) IV Continuous <Continuous>  dextrose 50% Injectable 12.5 Gram(s) IV Push once  dextrose 50% Injectable 25 Gram(s) IV Push once  dextrose 50% Injectable 25 Gram(s) IV Push once  enoxaparin Injectable 40 milliGRAM(s) SubCutaneous every 12 hours  insulin glargine Injectable (LANTUS) 24 Unit(s) SubCutaneous at bedtime  insulin lispro (HumaLOG) corrective regimen sliding scale   SubCutaneous three times a day before meals  insulin lispro (HumaLOG) corrective regimen sliding scale   SubCutaneous at bedtime  insulin lispro Injectable (HumaLOG) 8 Unit(s) SubCutaneous three times a day before meals  insulin NPH human recombinant 12 Unit(s) SubCutaneous once  lisinopril 5 milliGRAM(s) Oral daily  piperacillin/tazobactam IVPB.. 3.375 Gram(s) IV Intermittent every 8 hours  sodium chloride 0.9% lock flush 3 milliLiter(s) IV Push every 8 hours  vancomycin  IVPB 1000 milliGRAM(s) IV Intermittent every 12 hours    MEDICATIONS  (PRN):  acetaminophen   Tablet .. 975 milliGRAM(s) Oral every 6 hours PRN Temp greater or equal to 38C (100.4F), Mild Pain (1 - 3), Moderate Pain (4 - 6)  dextrose 40% Gel 15 Gram(s) Oral once PRN Blood Glucose LESS THAN 70 milliGRAM(s)/deciliter  glucagon  Injectable 1 milliGRAM(s) IntraMuscular once PRN Glucose LESS THAN 70 milligrams/deciliter

## 2019-08-13 NOTE — PROGRESS NOTE ADULT - PROBLEM SELECTOR PLAN 2
9-day history of skin lesion in the setting of uncontrolled diabetes. Pt presented septic with leukocytosis, fever, and tachycardia. Clinically improving.  - antibiotics as above  - F/u BCx, UA, and Ucx. Likely dilutional due to IV fluid hydration with a component of intracellular shift with insulin  - replete as needed

## 2019-08-13 NOTE — PROGRESS NOTE ADULT - PROBLEM SELECTOR PLAN 3
A1C 13  - Endocrinology consulted, recs appreciated.  - NPH 12U this AM   - Lantus 24u QAM and Humalog 8u TID and mISS with FSG.  - Patient was on metformin at some point but with poor adherence. Patient will need to be on insulin upon discharge given her elevated A1C level.  - Patient will need to follow up with Endocrinology outpatient.   - Dietitian consult placed. Likely dilutional due to IV fluid hydration with a component of intracellular shift with insulin  - replete as needed A1C 13  - Endocrinology consulted, recs appreciated.  - NPH 12U this AM   - Lantus 24u QAM and Humalog 8u TID and mISS with FSG.  - Patient was on metformin at some point but with poor adherence. Patient will need to be on insulin upon discharge given her elevated A1C level.   - endo clinic appt: Sept 9th and Nov 14

## 2019-08-13 NOTE — PROGRESS NOTE ADULT - SUBJECTIVE AND OBJECTIVE BOX
Chief Complaint/Follow-up on: T2DM    Subjective: RN/ABHIJEET Castillo came by to see the patient and provide education this am. The patient found it helpful. Last night she had an I&D done for her back. Her bs have improved immensely afterwards. She is being transitioned today to [facility] Lantus.   Eating well.    MEDICATIONS  (STANDING):  dextrose 5%. 1000 milliLiter(s) (50 mL/Hr) IV Continuous <Continuous>  dextrose 50% Injectable 12.5 Gram(s) IV Push once  dextrose 50% Injectable 25 Gram(s) IV Push once  dextrose 50% Injectable 25 Gram(s) IV Push once  enoxaparin Injectable 40 milliGRAM(s) SubCutaneous every 12 hours  insulin glargine Injectable (LANTUS) 24 Unit(s) SubCutaneous at bedtime  insulin lispro (HumaLOG) corrective regimen sliding scale   SubCutaneous three times a day before meals  insulin lispro (HumaLOG) corrective regimen sliding scale   SubCutaneous at bedtime  insulin lispro Injectable (HumaLOG) 8 Unit(s) SubCutaneous three times a day before meals  insulin NPH human recombinant 12 Unit(s) SubCutaneous once  lisinopril 5 milliGRAM(s) Oral daily  piperacillin/tazobactam IVPB.. 3.375 Gram(s) IV Intermittent every 8 hours  sodium chloride 0.9% lock flush 3 milliLiter(s) IV Push every 8 hours  vancomycin  IVPB 1000 milliGRAM(s) IV Intermittent every 12 hours    MEDICATIONS  (PRN):  acetaminophen   Tablet .. 975 milliGRAM(s) Oral every 6 hours PRN Temp greater or equal to 38C (100.4F), Mild Pain (1 - 3), Moderate Pain (4 - 6)  dextrose 40% Gel 15 Gram(s) Oral once PRN Blood Glucose LESS THAN 70 milliGRAM(s)/deciliter  glucagon  Injectable 1 milliGRAM(s) IntraMuscular once PRN Glucose LESS THAN 70 milligrams/deciliter      PHYSICAL EXAM:  VITALS: T(C): 36.7 (08-13-19 @ 13:26)  T(F): 98.1 (08-13-19 @ 13:26), Max: 99 (08-12-19 @ 20:32)  HR: 86 (08-13-19 @ 13:26) (86 - 94)  BP: 146/95 (08-13-19 @ 13:26) (126/83 - 146/95)  RR:  (16 - 18)  SpO2:  (97% - 100%)  Wt(kg): --  GENERAL: NAD, well-groomed, well-developed  EYES: No proptosis, no injection  RESPIRATORY: Clear to auscultation bilaterally; No rales, rhonchi, wheezing, or rubs  CARDIOVASCULAR: Regular rate and rhythm; No murmurs  GI: Soft, nontender, non distended, normal bowel sounds      POCT Blood Glucose.: 101 mg/dL (08-13-19 @ 18:35)  POCT Blood Glucose.: 132 mg/dL (08-13-19 @ 14:01)  POCT Blood Glucose.: 138 mg/dL (08-13-19 @ 08:45)  POCT Blood Glucose.: 115 mg/dL (08-12-19 @ 21:49)  POCT Blood Glucose.: 159 mg/dL (08-12-19 @ 17:14)  POCT Blood Glucose.: 229 mg/dL (08-12-19 @ 13:59)  POCT Blood Glucose.: 253 mg/dL (08-12-19 @ 08:58)  POCT Blood Glucose.: 186 mg/dL (08-11-19 @ 22:18)  POCT Blood Glucose.: 243 mg/dL (08-11-19 @ 19:36)  POCT Blood Glucose.: 215 mg/dL (08-11-19 @ 18:33)  POCT Blood Glucose.: 331 mg/dL (08-11-19 @ 12:56)  POCT Blood Glucose.: 289 mg/dL (08-11-19 @ 08:51)  POCT Blood Glucose.: 321 mg/dL (08-10-19 @ 22:02)    08-13    139  |  100  |  4<L>  ----------------------------<  134<H>  3.2<L>   |  24  |  0.67    EGFR if : 133  EGFR if non : 115    Ca    8.8      08-13  Mg     1.8     08-13  Phos  4.0     08-13    TPro  6.8  /  Alb  3.1<L>  /  TBili  0.5  /  DBili  x   /  AST  17  /  ALT  12  /  AlkPhos  80  08-12          Thyroid Function Tests:  08-12 @ 09:59 TSH 2.29 FreeT4 -- T3 -- Anti TPO -- Anti Thyroglobulin Ab -- TSI --      Hemoglobin A1C, Whole Blood: 13.0 % <H> [4.0 - 5.6] (08-11-19 @ 05:36)

## 2019-08-13 NOTE — PROGRESS NOTE ADULT - PROBLEM SELECTOR PLAN 5
K 3.6 -> 3.2 in ED. Likely dilutional due to IV fluid hydration with a component of intracellular shift with 2u Humalog  - stable. continue to monitor BP better controlled.   - lisinopril 5mg for renal protection and BP control - DVT PPx: Lovenox SQ  - Diet: DASH/TLC/CC  - Full Code

## 2019-08-13 NOTE — DIETITIAN INITIAL EVALUATION ADULT. - PROBLEM SELECTOR PLAN 3
- A1C 13 current presentation  - Endocrinology consult placed. Recs appreciated.  - Will start Lantus 21u QAM and Humalog 7u QAC and mISS with FSG per verbal recs from Endo.  - Patient was on metformin at some point but with poor adherence. Does not recall when last taken. Patient will need to be on insulin upon discharge given her elevated A1C level.  - Patient will need to follow up with Endocrinology outpatient. Patient will also benefit from following up podiatry and ophtho outpatient, however no acute issue for inpatient identified.  - Dietitian consult placed.  - To order ABx in NS, not D5W.

## 2019-08-13 NOTE — DIETITIAN INITIAL EVALUATION ADULT. - PROBLEM SELECTOR PLAN 2
- 9-day history of skin lesion in the setting of uncontrolled diabetes.  - Presenting septic with leukocytosis, fever, and tachycardia.  - Started on clindamycin in ED. Will broaden antibiotic coverage with vanc and Zosyn as above.  - F/u BCx and UA.  - Will attempt to obtain wound culture

## 2019-08-13 NOTE — PROGRESS NOTE ADULT - PROBLEM SELECTOR PLAN 4
BP better controlled.   - lisinopril 5mg for renal protection and BP control A1C 13  - Endocrinology consulted, recs appreciated.  - NPH 12U this AM   - Lantus 24u QAM and Humalog 8u TID and mISS with FSG.  - Patient was on metformin at some point but with poor adherence. Patient will need to be on insulin upon discharge given her elevated A1C level.  - Patient will need to follow up with Endocrinology outpatient.   - Dietitian consult placed.

## 2019-08-13 NOTE — PROGRESS NOTE ADULT - SUBJECTIVE AND OBJECTIVE BOX
[facility] DAILY PROGRESS NOTE:       SUBJECTIVE/ROS: Patient seen and examined at bedside.  Her pain is well controlled.  She has been tolerating a regular diet.       MEDICATIONS  (STANDING):  dextrose 5%. 1000 milliLiter(s) (50 mL/Hr) IV Continuous <Continuous>  dextrose 50% Injectable 12.5 Gram(s) IV Push once  dextrose 50% Injectable 25 Gram(s) IV Push once  dextrose 50% Injectable 25 Gram(s) IV Push once  enoxaparin Injectable 40 milliGRAM(s) SubCutaneous every 12 hours  insulin glargine Injectable (LANTUS) 24 Unit(s) SubCutaneous at bedtime  insulin lispro (HumaLOG) corrective regimen sliding scale   SubCutaneous three times a day before meals  insulin lispro (HumaLOG) corrective regimen sliding scale   SubCutaneous at bedtime  insulin lispro Injectable (HumaLOG) 8 Unit(s) SubCutaneous three times a day before meals  insulin NPH human recombinant 12 Unit(s) SubCutaneous once  lisinopril 5 milliGRAM(s) Oral daily  piperacillin/tazobactam IVPB.. 3.375 Gram(s) IV Intermittent every 8 hours  sodium chloride 0.9% lock flush 3 milliLiter(s) IV Push every 8 hours  vancomycin  IVPB 1000 milliGRAM(s) IV Intermittent every 12 hours    MEDICATIONS  (PRN):  acetaminophen   Tablet .. 975 milliGRAM(s) Oral every 6 hours PRN Temp greater or equal to 38C (100.4F), Mild Pain (1 - 3), Moderate Pain (4 - 6)  dextrose 40% Gel 15 Gram(s) Oral once PRN Blood Glucose LESS THAN 70 milliGRAM(s)/deciliter  glucagon  Injectable 1 milliGRAM(s) IntraMuscular once PRN Glucose LESS THAN 70 milligrams/deciliter      OBJECTIVE:    Vital Signs Last 24 Hrs  T(C): 36.7 (13 Aug 2019 13:26), Max: 37.2 (12 Aug 2019 20:32)  T(F): 98.1 (13 Aug 2019 13:26), Max: 99 (12 Aug 2019 20:32)  HR: 86 (13 Aug 2019 13:26) (86 - 94)  BP: 146/95 (13 Aug 2019 13:26) (126/83 - 146/95)  BP(mean): --  RR: 18 (13 Aug 2019 13:26) (16 - 18)  SpO2: 97% (13 Aug 2019 13:26) (97% - 100%)        I&O's Detail    12 Aug 2019 07:01  -  13 Aug 2019 07:00  --------------------------------------------------------  IN:    IV PiggyBack: 350 mL    Oral Fluid: 850 mL    sodium chloride 0.9%: 1250 mL  Total IN: 2450 mL    OUT:  Total OUT: 0 mL    Total NET: 2450 mL          Daily     Daily Weight in k (13 Aug 2019 11:11)    LABS:                        11.2   8.4   )-----------( 250      ( 13 Aug 2019 07:05 )             33.1     08-13    139  |  100  |  4<L>  ----------------------------<  134<H>  3.2<L>   |  24  |  0.67    Ca    8.8      13 Aug 2019 07:06  Phos  4.0     08-  Mg     1.8     -    TPro  6.8  /  Alb  3.1<L>  /  TBili  0.5  /  DBili  x   /  AST  17  /  ALT  12  /  AlkPhos  80  08-12      Urinalysis Basic - ( 12 Aug 2019 18:01 )    Color: Dark Yellow / Appearance: Turbid / S.043 / pH: x  Gluc: x / Ketone: Small  / Bili: Negative / Urobili: Negative   Blood: x / Protein: 100 / Nitrite: Negative   Leuk Esterase: Negative / RBC: 18 /hpf / WBC 7 /HPF   Sq Epi: x / Non Sq Epi: 5 / Bacteria: Negative                PHYSICAL EXAM:  Constitutional: well developed, well nourished, NAD  Respiratory: No increased WOB  Back: 5-6 cm area of erythema (improving) wound with minimal purulent drainage.  Packing changed this afternoon.  Psych: A&Ox3        34F with abscess on back, s/p I&D     - Continue BID packing changes with 1'' plain packing, cover with gauze and paper tape, may transition to daily dressing changes upon discharge home  - Recommend continuing antibiotics until cellulitis improves  - Please page green surgery with any questions/concerns [phone]

## 2019-08-13 NOTE — DIETITIAN INITIAL EVALUATION ADULT. - OTHER INFO
Pt reported decreased appetite and po intake x 1 week PTA. Prior to that, pt was following a Regular diet, eating 2 meals per day, usually takeout for lunch and [employer] for dinner. Stated she was not checking her FS at home, but is committed to following a DM diet. Provided education on DM diet and balanced diet; educated pt on carb counting, importance of pairing carbs + prot, and whole grains vs processed carbs. Pt was receptive, engaged, and appreciated education. Handouts were provided. Reported .9 kg/244#, admit wt 110.7 kg/243#, wt stable. Denied any food allergies, but cannot tolerate milk, can tolerate yogurt and cheese. Denied chewing or swallowing difficulties. Was taking a Multivitamin PTA. No edema or pressure injuries noted at this time per flow sheet.

## 2019-08-14 ENCOUNTER — TRANSCRIPTION ENCOUNTER (OUTPATIENT)
Age: 34
End: 2019-08-14

## 2019-08-14 VITALS
RESPIRATION RATE: 18 BRPM | DIASTOLIC BLOOD PRESSURE: 89 MMHG | OXYGEN SATURATION: 99 % | SYSTOLIC BLOOD PRESSURE: 145 MMHG | TEMPERATURE: 98 F | HEART RATE: 85 BPM

## 2019-08-14 LAB
-  AMPICILLIN/SULBACTAM: SIGNIFICANT CHANGE UP
-  CEFAZOLIN: SIGNIFICANT CHANGE UP
-  CLINDAMYCIN: SIGNIFICANT CHANGE UP
-  ERYTHROMYCIN: SIGNIFICANT CHANGE UP
-  GENTAMICIN: SIGNIFICANT CHANGE UP
-  OXACILLIN: SIGNIFICANT CHANGE UP
-  PENICILLIN: SIGNIFICANT CHANGE UP
-  RIFAMPIN: SIGNIFICANT CHANGE UP
-  TETRACYCLINE: SIGNIFICANT CHANGE UP
-  TRIMETHOPRIM/SULFAMETHOXAZOLE: SIGNIFICANT CHANGE UP
-  VANCOMYCIN: SIGNIFICANT CHANGE UP
ANION GAP SERPL CALC-SCNC: 14 MMOL/L — SIGNIFICANT CHANGE UP (ref 5–17)
BASOPHILS # BLD AUTO: 0 K/UL — SIGNIFICANT CHANGE UP (ref 0–0.2)
BASOPHILS NFR BLD AUTO: 0.4 % — SIGNIFICANT CHANGE UP (ref 0–2)
BUN SERPL-MCNC: <4 MG/DL — LOW (ref 7–23)
CALCIUM SERPL-MCNC: 9 MG/DL — SIGNIFICANT CHANGE UP (ref 8.4–10.5)
CHLORIDE SERPL-SCNC: 101 MMOL/L — SIGNIFICANT CHANGE UP (ref 96–108)
CO2 SERPL-SCNC: 25 MMOL/L — SIGNIFICANT CHANGE UP (ref 22–31)
CREAT SERPL-MCNC: 0.73 MG/DL — SIGNIFICANT CHANGE UP (ref 0.5–1.3)
CULTURE RESULTS: SIGNIFICANT CHANGE UP
EOSINOPHIL # BLD AUTO: 0.2 K/UL — SIGNIFICANT CHANGE UP (ref 0–0.5)
EOSINOPHIL NFR BLD AUTO: 2.5 % — SIGNIFICANT CHANGE UP (ref 0–6)
GLUCOSE BLDC GLUCOMTR-MCNC: 107 MG/DL — HIGH (ref 70–99)
GLUCOSE BLDC GLUCOMTR-MCNC: 131 MG/DL — HIGH (ref 70–99)
GLUCOSE SERPL-MCNC: 115 MG/DL — HIGH (ref 70–99)
HCT VFR BLD CALC: 32.9 % — LOW (ref 34.5–45)
HGB BLD-MCNC: 10.6 G/DL — LOW (ref 11.5–15.5)
LYMPHOCYTES # BLD AUTO: 1.8 K/UL — SIGNIFICANT CHANGE UP (ref 1–3.3)
LYMPHOCYTES # BLD AUTO: 22.4 % — SIGNIFICANT CHANGE UP (ref 13–44)
MAGNESIUM SERPL-MCNC: 2 MG/DL — SIGNIFICANT CHANGE UP (ref 1.6–2.6)
MCHC RBC-ENTMCNC: 27.1 PG — SIGNIFICANT CHANGE UP (ref 27–34)
MCHC RBC-ENTMCNC: 32.3 GM/DL — SIGNIFICANT CHANGE UP (ref 32–36)
MCV RBC AUTO: 83.8 FL — SIGNIFICANT CHANGE UP (ref 80–100)
METHOD TYPE: SIGNIFICANT CHANGE UP
MONOCYTES # BLD AUTO: 0.8 K/UL — SIGNIFICANT CHANGE UP (ref 0–0.9)
MONOCYTES NFR BLD AUTO: 9.6 % — SIGNIFICANT CHANGE UP (ref 2–14)
NEUTROPHILS # BLD AUTO: 5.1 K/UL — SIGNIFICANT CHANGE UP (ref 1.8–7.4)
NEUTROPHILS NFR BLD AUTO: 65.1 % — SIGNIFICANT CHANGE UP (ref 43–77)
ORGANISM # SPEC MICROSCOPIC CNT: SIGNIFICANT CHANGE UP
ORGANISM # SPEC MICROSCOPIC CNT: SIGNIFICANT CHANGE UP
PLATELET # BLD AUTO: 298 K/UL — SIGNIFICANT CHANGE UP (ref 150–400)
POTASSIUM SERPL-MCNC: 3.1 MMOL/L — LOW (ref 3.5–5.3)
POTASSIUM SERPL-SCNC: 3.1 MMOL/L — LOW (ref 3.5–5.3)
RBC # BLD: 3.93 M/UL — SIGNIFICANT CHANGE UP (ref 3.8–5.2)
RBC # FLD: 14.8 % — HIGH (ref 10.3–14.5)
SODIUM SERPL-SCNC: 140 MMOL/L — SIGNIFICANT CHANGE UP (ref 135–145)
SPECIMEN SOURCE: SIGNIFICANT CHANGE UP
WBC # BLD: 7.9 K/UL — SIGNIFICANT CHANGE UP (ref 3.8–10.5)
WBC # FLD AUTO: 7.9 K/UL — SIGNIFICANT CHANGE UP (ref 3.8–10.5)

## 2019-08-14 PROCEDURE — 84443 ASSAY THYROID STIM HORMONE: CPT

## 2019-08-14 PROCEDURE — 82803 BLOOD GASES ANY COMBINATION: CPT

## 2019-08-14 PROCEDURE — 82947 ASSAY GLUCOSE BLOOD QUANT: CPT

## 2019-08-14 PROCEDURE — 83735 ASSAY OF MAGNESIUM: CPT

## 2019-08-14 PROCEDURE — 99232 SBSQ HOSP IP/OBS MODERATE 35: CPT

## 2019-08-14 PROCEDURE — 80061 LIPID PANEL: CPT

## 2019-08-14 PROCEDURE — 99239 HOSP IP/OBS DSCHRG MGMT >30: CPT | Mod: GC

## 2019-08-14 PROCEDURE — 87040 BLOOD CULTURE FOR BACTERIA: CPT

## 2019-08-14 PROCEDURE — 84702 CHORIONIC GONADOTROPIN TEST: CPT

## 2019-08-14 PROCEDURE — 82330 ASSAY OF CALCIUM: CPT

## 2019-08-14 PROCEDURE — 71260 CT THORAX DX C+: CPT

## 2019-08-14 PROCEDURE — 80202 ASSAY OF VANCOMYCIN: CPT

## 2019-08-14 PROCEDURE — 85027 COMPLETE CBC AUTOMATED: CPT

## 2019-08-14 PROCEDURE — 84100 ASSAY OF PHOSPHORUS: CPT

## 2019-08-14 PROCEDURE — 96376 TX/PRO/DX INJ SAME DRUG ADON: CPT | Mod: XU

## 2019-08-14 PROCEDURE — 87205 SMEAR GRAM STAIN: CPT

## 2019-08-14 PROCEDURE — 80053 COMPREHEN METABOLIC PANEL: CPT

## 2019-08-14 PROCEDURE — 87075 CULTR BACTERIA EXCEPT BLOOD: CPT

## 2019-08-14 PROCEDURE — 82550 ASSAY OF CK (CPK): CPT

## 2019-08-14 PROCEDURE — 83605 ASSAY OF LACTIC ACID: CPT

## 2019-08-14 PROCEDURE — 84132 ASSAY OF SERUM POTASSIUM: CPT

## 2019-08-14 PROCEDURE — 84295 ASSAY OF SERUM SODIUM: CPT

## 2019-08-14 PROCEDURE — 80048 BASIC METABOLIC PNL TOTAL CA: CPT

## 2019-08-14 PROCEDURE — 99285 EMERGENCY DEPT VISIT HI MDM: CPT | Mod: 25

## 2019-08-14 PROCEDURE — 82962 GLUCOSE BLOOD TEST: CPT

## 2019-08-14 PROCEDURE — 96365 THER/PROPH/DIAG IV INF INIT: CPT | Mod: XU

## 2019-08-14 PROCEDURE — G0378: CPT

## 2019-08-14 PROCEDURE — 82435 ASSAY OF BLOOD CHLORIDE: CPT

## 2019-08-14 PROCEDURE — 96375 TX/PRO/DX INJ NEW DRUG ADDON: CPT

## 2019-08-14 PROCEDURE — 83036 HEMOGLOBIN GLYCOSYLATED A1C: CPT

## 2019-08-14 PROCEDURE — 81001 URINALYSIS AUTO W/SCOPE: CPT

## 2019-08-14 PROCEDURE — 87070 CULTURE OTHR SPECIMN AEROBIC: CPT

## 2019-08-14 PROCEDURE — 85014 HEMATOCRIT: CPT

## 2019-08-14 PROCEDURE — 87186 SC STD MICRODIL/AGAR DIL: CPT

## 2019-08-14 RX ORDER — EXENATIDE 250 UG/ML
2 INJECTION SUBCUTANEOUS
Qty: 0 | Refills: 0 | DISCHARGE

## 2019-08-14 RX ORDER — POTASSIUM CHLORIDE 20 MEQ
20 PACKET (EA) ORAL ONCE
Refills: 0 | Status: DISCONTINUED | OUTPATIENT
Start: 2019-08-14 | End: 2019-08-14

## 2019-08-14 RX ORDER — POTASSIUM CHLORIDE 20 MEQ
40 PACKET (EA) ORAL ONCE
Refills: 0 | Status: COMPLETED | OUTPATIENT
Start: 2019-08-14 | End: 2019-08-14

## 2019-08-14 RX ORDER — EXENATIDE 250 UG/ML
2 INJECTION SUBCUTANEOUS
Qty: 10 | Refills: 0
Start: 2019-08-14 | End: 2019-09-12

## 2019-08-14 RX ORDER — ENOXAPARIN SODIUM 100 MG/ML
20 INJECTION SUBCUTANEOUS
Qty: 8 | Refills: 0
Start: 2019-08-14 | End: 2019-09-12

## 2019-08-14 RX ORDER — INSULIN LISPRO 100/ML
VIAL (ML) SUBCUTANEOUS AT BEDTIME
Refills: 0 | Status: DISCONTINUED | OUTPATIENT
Start: 2019-08-14 | End: 2019-08-14

## 2019-08-14 RX ORDER — CEPHALEXIN 500 MG
1 CAPSULE ORAL
Qty: 28 | Refills: 0
Start: 2019-08-14 | End: 2019-08-20

## 2019-08-14 RX ORDER — INSULIN LISPRO 100/ML
VIAL (ML) SUBCUTANEOUS
Refills: 0 | Status: DISCONTINUED | OUTPATIENT
Start: 2019-08-14 | End: 2019-08-14

## 2019-08-14 RX ADMIN — Medication 6 UNIT(S): at 13:10

## 2019-08-14 RX ADMIN — Medication 6 UNIT(S): at 10:25

## 2019-08-14 RX ADMIN — SODIUM CHLORIDE 3 MILLILITER(S): 9 INJECTION INTRAMUSCULAR; INTRAVENOUS; SUBCUTANEOUS at 06:06

## 2019-08-14 RX ADMIN — SODIUM CHLORIDE 3 MILLILITER(S): 9 INJECTION INTRAMUSCULAR; INTRAVENOUS; SUBCUTANEOUS at 11:40

## 2019-08-14 RX ADMIN — ENOXAPARIN SODIUM 40 MILLIGRAM(S): 100 INJECTION SUBCUTANEOUS at 06:05

## 2019-08-14 RX ADMIN — PIPERACILLIN AND TAZOBACTAM 25 GRAM(S): 4; .5 INJECTION, POWDER, LYOPHILIZED, FOR SOLUTION INTRAVENOUS at 10:25

## 2019-08-14 RX ADMIN — LISINOPRIL 5 MILLIGRAM(S): 2.5 TABLET ORAL at 06:02

## 2019-08-14 RX ADMIN — Medication 40 MILLIEQUIVALENT(S): at 10:26

## 2019-08-14 RX ADMIN — Medication 166.67 MILLIGRAM(S): at 06:04

## 2019-08-14 NOTE — PROGRESS NOTE ADULT - PROBLEM SELECTOR PLAN 1
pt p/w sepsis (fever, tachycardia, and leukocytosis) in the setting of phlegmon on CT. Negative lactate (1.9). Clinically improved. s/p I&D 8/12  - switch to PO clindamycin?  - S/p 2L LR   - S/p clindamycin x1 in ED  - s/p I&D 8/12  - pt to follow up with surgery in 1-2 weeks pt p/w sepsis (fever, tachycardia, and leukocytosis) in the setting of phlegmon on CT. Negative lactate (1.9). Clinically improved. s/p I&D 8/12  - switch to PO clindamycin?  - daily dressing changes upon discharge home  - S/p 2L LR   - S/p clindamycin x1 in ED  - s/p I&D 8/12  - pt to follow up with surgery in 1-2 weeks

## 2019-08-14 NOTE — PROGRESS NOTE ADULT - SUBJECTIVE AND OBJECTIVE BOX
Chief Complaint: Uncontrolled DM2    S: Pt. feeling better today. She states appetite is improving.    MEDICATIONS  (STANDING):  dextrose 5%. 1000 milliLiter(s) (50 mL/Hr) IV Continuous <Continuous>  dextrose 50% Injectable 12.5 Gram(s) IV Push once  dextrose 50% Injectable 25 Gram(s) IV Push once  dextrose 50% Injectable 25 Gram(s) IV Push once  enoxaparin Injectable 40 milliGRAM(s) SubCutaneous every 12 hours  insulin glargine Injectable (LANTUS) 20 Unit(s) SubCutaneous at bedtime  insulin lispro (HumaLOG) corrective regimen sliding scale   SubCutaneous three times a day before meals  insulin lispro (HumaLOG) corrective regimen sliding scale   SubCutaneous at bedtime  insulin lispro Injectable (HumaLOG) 6 Unit(s) SubCutaneous three times a day before meals  insulin NPH human recombinant 12 Unit(s) SubCutaneous once  lisinopril 5 milliGRAM(s) Oral daily  piperacillin/tazobactam IVPB.. 3.375 Gram(s) IV Intermittent every 8 hours  potassium chloride    Tablet ER 40 milliEquivalent(s) Oral once  potassium chloride    Tablet ER 20 milliEquivalent(s) Oral once  sodium chloride 0.9% lock flush 3 milliLiter(s) IV Push every 8 hours  vancomycin  IVPB 1250 milliGRAM(s) IV Intermittent every 8 hours    MEDICATIONS  (PRN):  acetaminophen   Tablet .. 975 milliGRAM(s) Oral every 6 hours PRN Temp greater or equal to 38C (100.4F), Mild Pain (1 - 3), Moderate Pain (4 - 6)  dextrose 40% Gel 15 Gram(s) Oral once PRN Blood Glucose LESS THAN 70 milliGRAM(s)/deciliter  glucagon  Injectable 1 milliGRAM(s) IntraMuscular once PRN Glucose LESS THAN 70 milligrams/deciliter      Allergies  No Known Allergies      Review of Systems:  Constitutional: No fever  Eyes: No blurry vision  Neuro: No tremors  HEENT: No pain  Cardiovascular: No chest pain, palpitations  Respiratory: No SOB, no cough  GI: No nausea, vomiting, abdominal pain  : No dysuria  Skin: no rash  Psych: no depression  Endocrine: no polyuria, polydipsia  Hem/lymph: no swelling  Osteoporosis: no fractures      PHYSICAL EXAM:  VITALS: T(C): 36.8 (08-14-19 @ 05:04)  T(F): 98.2 (08-14-19 @ 05:04), Max: 98.3 (08-13-19 @ 21:10)  HR: 83 (08-14-19 @ 05:04) (83 - 86)  BP: 145/81 (08-14-19 @ 05:04) (145/81 - 147/94)  RR:  (18 - 18)  SpO2:  (97% - 99%)  Wt(kg): --  GENERAL: NAD, well-groomed, well-developed  EYES: No proptosis, anicteric  HEENT:  Atraumatic, Normocephalic, moist mucous membranes  SKIN: Dry, intact  MUSCULOSKELETAL: Full range of motion  PSYCH: Alert and oriented x 3, normal affect, normal mood    POCT Blood Glucose.: 131 mg/dL (08-14-19 @ 10:10)  POCT Blood Glucose.: 102 mg/dL (08-13-19 @ 22:02)  POCT Blood Glucose.: 101 mg/dL (08-13-19 @ 18:35)  POCT Blood Glucose.: 132 mg/dL (08-13-19 @ 14:01)  POCT Blood Glucose.: 138 mg/dL (08-13-19 @ 08:45)  POCT Blood Glucose.: 115 mg/dL (08-12-19 @ 21:49)  POCT Blood Glucose.: 159 mg/dL (08-12-19 @ 17:14)  POCT Blood Glucose.: 229 mg/dL (08-12-19 @ 13:59)  POCT Blood Glucose.: 253 mg/dL (08-12-19 @ 08:58)  POCT Blood Glucose.: 186 mg/dL (08-11-19 @ 22:18)  POCT Blood Glucose.: 243 mg/dL (08-11-19 @ 19:36)  POCT Blood Glucose.: 215 mg/dL (08-11-19 @ 18:33)  POCT Blood Glucose.: 331 mg/dL (08-11-19 @ 12:56)      08-14    140  |  101  |  <4<L>  ----------------------------<  115<H>  3.1<L>   |  25  |  0.73    EGFR if : 125  EGFR if non : 107    Ca    9.0      08-14  Mg     2.0     08-14  Phos  4.0     08-13    TPro  6.8  /  Alb  3.1<L>  /  TBili  0.5  /  DBili  x   /  AST  17  /  ALT  12  /  AlkPhos  80  08-12          Thyroid Function Tests:  08-12 @ 09:59 TSH 2.29 FreeT4 -- T3 -- Anti TPO -- Anti Thyroglobulin Ab -- TSI --      Hemoglobin A1C, Whole Blood: 13.0 % <H> [4.0 - 5.6] (08-11-19 @ 05:36)

## 2019-08-14 NOTE — DISCHARGE NOTE NURSING/CASE MANAGEMENT/SOCIAL WORK - NSDCDPATPORTLINK_GEN_ALL_CORE
You can access the [facility], offered by [facility], by registering with the following website: [email or web address]

## 2019-08-14 NOTE — PROGRESS NOTE ADULT - PROBLEM SELECTOR PLAN 1
-FS's currently well controlled on current doses, decreased yesterday.  -Cont. Lantus 20 units sq qhs and Humalog 6 units tid-ac  -Will change SS to low dose  DISPO: Dr. Bryn britt with intern and case mgt, the patient's HIP plan does not cover her scripts for insulin or testing supplies, asked them to see if maybe she needs a durable goods supplier like [employer]. The patient states that she has [employer], which usually is mail order but they often give an ok to an initial one month at a pharmacy.  Discharge on basal + GLP-1 agonist with meter and testing supplies to test BID.  F/u appt 9/9 at 430 PM with diabetes educator and 11/14/19 at 215PM with Dr. Adalgisa Sargent. Both at [address] [facility], [address].

## 2019-08-14 NOTE — PROGRESS NOTE ADULT - ASSESSMENT
34-yo F w/ PMH of T2DM and HTN, presenting with fever, chills, and a boil in the back, admitted for sepsis 2/2 abscess of upper back with uncontrolled DM, on vanc/zosyn, s/p I&D (8/12).

## 2019-08-14 NOTE — PROGRESS NOTE ADULT - SUBJECTIVE AND OBJECTIVE BOX
Patient is a 34y old  Female who presents with a chief complaint of Cellulitis (13 Aug 2019 19:04)      INTERVAL HPI/OVERNIGHT EVENTS:  Vanc trough overnight was subtherapeutic, vanc dose was increased to 1250 q8h. Lantus was decrease to 20U and humalog was decreased to 6U. This morning pt reports feeling well. She denies fever/chills, back pain, CP, abdominal pain.       Vital Signs Last 24 Hrs  T(C): 36.8 (14 Aug 2019 05:04), Max: 36.8 (13 Aug 2019 21:10)  T(F): 98.2 (14 Aug 2019 05:04), Max: 98.3 (13 Aug 2019 21:10)  HR: 83 (14 Aug 2019 05:04) (83 - 86)  BP: 145/81 (14 Aug 2019 05:04) (145/81 - 147/94)  RR: 18 (14 Aug 2019 05:04) (18 - 18)  SpO2: 99% (14 Aug 2019 05:04) (97% - 99%)    PHYSICAL EXAM:  GENERAL: NAD. comfortable appearing.   CHEST/LUNG: Clear to auscultation; No rales, rhonchi, or wheezing.  Respiratory effort does not appear labored.  HEART: Regular rate and rhythm; S1 and S2,  no murmurs, rubs, or gallops.  ABDOMEN: Soft, not tender to palpation.  No masses or HSM appreciated.  No distension.  Bowel sounds present.  BACK: I&D site covered with gauze.   EXTREMITIES:  No clubbing, cyanosis, or edema.  Moves all extremities with strength 5/5.  SKIN: No obvious rashes or lesions.  Turgor okay.  NEURO:  Alert and oriented x 3, no focal sensory or  motor deficit    LABS:                 CAPILLARY BLOOD GLUCOSE      POCT Blood Glucose.: 102 mg/dL (13 Aug 2019 22:02)  POCT Blood Glucose.: 101 mg/dL (13 Aug 2019 18:35)  POCT Blood Glucose.: 132 mg/dL (13 Aug 2019 14:01)  POCT Blood Glucose.: 138 mg/dL (13 Aug 2019 08:45)      Culture - Abscess with Gram Stain (collected 12 Aug 2019 22:32)  Source: .Abscess  Preliminary Report (13 Aug 2019 16:56):    Moderate Staphylococcus aureus    Culture - Other (collected 11 Aug 2019 18:08)  Source: Skin  Final Report (13 Aug 2019 17:14):    Few Staphylococcus aureus  Organism: Staphylococcus aureus (13 Aug 2019 17:14)  Organism: Staphylococcus aureus (13 Aug 2019 17:14) Patient is a 34y old  Female who presents with a chief complaint of Cellulitis (13 Aug 2019 19:04)      INTERVAL HPI/OVERNIGHT EVENTS:  Vanc trough overnight was subtherapeutic, vanc dose was increased to 1250 q8h. Lantus was decrease to 20U and humalog was decreased to 6U. This morning pt reports feeling well. She denies fever/chills, back pain, CP, abdominal pain.       Vital Signs Last 24 Hrs  T(C): 36.8 (14 Aug 2019 05:04), Max: 36.8 (13 Aug 2019 21:10)  T(F): 98.2 (14 Aug 2019 05:04), Max: 98.3 (13 Aug 2019 21:10)  HR: 83 (14 Aug 2019 05:04) (83 - 86)  BP: 145/81 (14 Aug 2019 05:04) (145/81 - 147/94)  RR: 18 (14 Aug 2019 05:04) (18 - 18)  SpO2: 99% (14 Aug 2019 05:04) (97% - 99%)    PHYSICAL EXAM:  GENERAL: NAD. comfortable appearing.   CHEST/LUNG: Clear to auscultation; No rales, rhonchi, or wheezing.  Respiratory effort does not appear labored.  HEART: Regular rate and rhythm; S1 and S2,  no murmurs, rubs, or gallops.  ABDOMEN: Soft, not tender to palpation.  No masses or HSM appreciated.  No distension.  Bowel sounds present.  BACK: I&D site covered with gauze.   EXTREMITIES:  No clubbing, cyanosis, or edema.  Moves all extremities with strength 5/5.  SKIN: No obvious rashes or lesions.  Turgor okay.  NEURO:  Alert and oriented x 3, no focal sensory or  motor deficit    LABS:  08-14    140  |  101  |  <4<L>  ----------------------------<  115<H>  3.1<L>   |  25  |  0.73    K: repleted    Ca    9.0      14 Aug 2019 07:07  Phos  4.0     08-13  Mg     2.0     08-14                 CAPILLARY BLOOD GLUCOSE  POCT Blood Glucose.: 102 mg/dL (13 Aug 2019 22:02)  POCT Blood Glucose.: 101 mg/dL (13 Aug 2019 18:35)  POCT Blood Glucose.: 132 mg/dL (13 Aug 2019 14:01)  POCT Blood Glucose.: 138 mg/dL (13 Aug 2019 08:45)      Culture - Abscess with Gram Stain (collected 12 Aug 2019 22:32)  Source: .Abscess  Preliminary Report (13 Aug 2019 16:56):    Moderate Staphylococcus aureus    Culture - Other (collected 11 Aug 2019 18:08)  Source: Skin  Final Report (13 Aug 2019 17:14):    Few Staphylococcus aureus  Organism: Staphylococcus aureus (13 Aug 2019 17:14)  Organism: Staphylococcus aureus (13 Aug 2019 17:14) Patient is a 34y old  Female who presents with a chief complaint of Cellulitis (13 Aug 2019 19:04)      INTERVAL HPI/OVERNIGHT EVENTS:  Vanc trough overnight was subtherapeutic, vanc dose was increased to 1250 q8h. Lantus was decrease to 20U and humalog was decreased to 6U. This morning pt reports feeling well. She denies fever/chills, back pain, CP, abdominal pain.       Vital Signs Last 24 Hrs  T(C): 36.8 (14 Aug 2019 05:04), Max: 36.8 (13 Aug 2019 21:10)  T(F): 98.2 (14 Aug 2019 05:04), Max: 98.3 (13 Aug 2019 21:10)  HR: 83 (14 Aug 2019 05:04) (83 - 86)  BP: 145/81 (14 Aug 2019 05:04) (145/81 - 147/94)  RR: 18 (14 Aug 2019 05:04) (18 - 18)  SpO2: 99% (14 Aug 2019 05:04) (97% - 99%)    PHYSICAL EXAM:  GENERAL: NAD. comfortable appearing.   CHEST/LUNG: Clear to auscultation; No rales, rhonchi, or wheezing.  Respiratory effort does not appear labored.  HEART: Regular rate and rhythm; S1 and S2,  no murmurs, rubs, or gallops.  ABDOMEN: Soft, not tender to palpation.  No masses or HSM appreciated.  No distension.  Bowel sounds present.  BACK: I&D site covered with gauze.   EXTREMITIES:  No clubbing, cyanosis, or edema.  Moves all extremities with strength 5/5.  SKIN: No obvious rashes or lesions.  Turgor okay.  NEURO:  Alert and oriented x 3, no focal sensory or  motor deficit    LABS:                        10.6   7.9   )-----------( 298      ( 14 Aug 2019 07:08 )             32.9       08-14    140  |  101  |  <4<L>  ----------------------------<  115<H>  3.1<L>   |  25  |  0.73    K: repleted    Ca    9.0      14 Aug 2019 07:07  Phos  4.0     08-13  Mg     2.0     08-14                 CAPILLARY BLOOD GLUCOSE  POCT Blood Glucose.: 102 mg/dL (13 Aug 2019 22:02)  POCT Blood Glucose.: 101 mg/dL (13 Aug 2019 18:35)  POCT Blood Glucose.: 132 mg/dL (13 Aug 2019 14:01)  POCT Blood Glucose.: 138 mg/dL (13 Aug 2019 08:45)      Culture - Abscess with Gram Stain (collected 12 Aug 2019 22:32)  Source: .Abscess  Preliminary Report (13 Aug 2019 16:56):    Moderate Staphylococcus aureus    Culture - Other (collected 11 Aug 2019 18:08)  Source: Skin  Final Report (13 Aug 2019 17:14):    Few Staphylococcus aureus  Organism: Staphylococcus aureus (13 Aug 2019 17:14)  Organism: Staphylococcus aureus (13 Aug 2019 17:14)

## 2019-08-14 NOTE — PROGRESS NOTE ADULT - PROVIDER SPECIALTY LIST ADULT
Endocrinology
Endocrinology
Internal Medicine
Internal Medicine
Surgery
Endocrinology
Internal Medicine

## 2019-08-14 NOTE — PROGRESS NOTE ADULT - NSHPATTENDINGPLANDISCUSS_GEN_ALL_CORE
Dr. Banegas, [facility] pharmacist, case mgt and patient
Primary Team Intern, Patient and her father
house staff

## 2019-08-14 NOTE — PROGRESS NOTE ADULT - ATTENDING COMMENTS
Seen, examined the patient with house staff in am  - Feels better, afebrile, sitting in bed, less pain in MAGED back    s/p I & D abscess by Sx and has a wound pack   - wound c/s grew MSSA,    IV anbx changed to PO Keflex 500mg q 6 hrs for total 10 days treatment  - Endocrine plan noted. Patient has been noncompliant on meds    c/w Insulin per rec, and ACE at low dose    dietary evaluated, diabetic teaching, teaching for FS by RN all done, SW evaluated  - d/c today home with home care  - family at bedside is aware of plan  ** discharge time- 37 min
Seen, examined the patient with house staff  - sitting in chair, c/o pain at time in MAGED back, remains afebrile, not septic now    Pus coming out of the swelling in MAGED back, it looks abscess   - Sx consult for I & D, wound c/s, c/w IV anbx- IV vanc/zosyn    will d/c on oral anbx after the c/s report  - Endocrine consult appreciated. Patient has been non compliant on meds    c/w Insulin per rec, and ACE at low dose    SW eval  - spoke to family at bedside
Seen, examined the patient with house staff  - afebrile, sitting in chair, less pain in MAGED back    s/p I & D of abscess by Sx yesterday and has a pack   - wound c/s sent, initial wound cs positive for staph    c/w IV anbx- IV vanc/zosyn  - Endocrine plan noted. Patient has been non compliant on meds    c/w Insulin per rec, and ACE at low dose    dietary evaluated, diabetic teaching, teaching for FS by RN    RIMA kelly  - d/c planning in progress once wound OR c/s is back on oral anbx  - she needs to f/u outpatient with Sx, Endo upon d/c  - spoke to family at bedside

## 2019-08-14 NOTE — PROGRESS NOTE ADULT - PROBLEM SELECTOR PLAN 2
Likely dilutional due to IV fluid hydration with a component of intracellular shift with insulin  - replete as needed

## 2019-08-14 NOTE — DISCHARGE NOTE NURSING/CASE MANAGEMENT/SOCIAL WORK - MODE OF TRANSPORTATION
Your rapid strep came back negative      the strep culture was sent in .     For the sore throat use warm tea and honey or even just spoonful of honey and hold it to the back of the throat. This will help to decrease the inflammation and thin the mucous.    gargle with warm salt water.       If the sore throat doesn't get better let me know      
Ambulatory

## 2019-08-14 NOTE — PROGRESS NOTE ADULT - PROBLEM SELECTOR PLAN 3
A1C 13  - Endocrinology consulted, recs appreciated.  - Lantus 20u  and Humalog 6u TID and mISS with FSG.   - endo clinic appt: Sept 9th and Nov 14

## 2019-08-15 LAB
CULTURE RESULTS: SIGNIFICANT CHANGE UP
CULTURE RESULTS: SIGNIFICANT CHANGE UP
SPECIMEN SOURCE: SIGNIFICANT CHANGE UP
SPECIMEN SOURCE: SIGNIFICANT CHANGE UP

## 2019-08-26 ENCOUNTER — APPOINTMENT (OUTPATIENT)
Dept: SURGERY | Facility: CLINIC | Age: 34
End: 2019-08-26
Payer: COMMERCIAL

## 2019-08-26 VITALS
BODY MASS INDEX: 40.29 KG/M2 | WEIGHT: 236 LBS | OXYGEN SATURATION: 99 % | SYSTOLIC BLOOD PRESSURE: 179 MMHG | HEIGHT: 64 IN | TEMPERATURE: 98.6 F | HEART RATE: 95 BPM | DIASTOLIC BLOOD PRESSURE: 110 MMHG | RESPIRATION RATE: 17 BRPM

## 2019-08-26 DIAGNOSIS — I10 ESSENTIAL (PRIMARY) HYPERTENSION: ICD-10-CM

## 2019-08-26 PROCEDURE — 99202 OFFICE O/P NEW SF 15 MIN: CPT

## 2019-08-26 RX ORDER — INSULIN GLARGINE 100 [IU]/ML
INJECTION, SOLUTION SUBCUTANEOUS
Refills: 0 | Status: ACTIVE | COMMUNITY

## 2019-08-26 NOTE — PHYSICAL EXAM
[de-identified] : well appearing, NAD [de-identified] : mucous membanes moist, sclerae anicteric [de-identified] : breathing comfortably. [de-identified] : s [de-identified] : I&D site of upper back is clean, dry, without erythema.  Small amount of purulence on packing strip.  Dressing changed.

## 2019-08-26 NOTE — HISTORY OF PRESENT ILLNESS
[de-identified] : Avril is a 35 y/o female here for evaluation of back abscess. PMH of type II diabetes. Patient presented to [facility] ED on 8/11/19 with fever, chills and a boil on the upper back. CT from 8/10/19 demonstrated ill-defined subcutaneous inflammatory change and phlegmon in the left upper back. No discrete peripherally enhancing abscess or drainable fluid collection. In the ER she was given 1 dose of Clindamycin and 2L LR. She was admitted to medicine for sepsis due to back abscess. Her antibiotics were switched to vanc/zosyn.  I&D performed. \par \par Patient has been doing well at home.  Completed course of antibiotics.  Reports no fevers or chills.  Sister has bee doing dressing changes daily with packing and gauze.  Minimal discomfort at I&D site during dressing changes.

## 2019-09-05 ENCOUNTER — EMERGENCY (EMERGENCY)
Facility: HOSPITAL | Age: 34
LOS: 1 days | Discharge: HOME | End: 2019-09-05
Attending: EMERGENCY MEDICINE
Payer: COMMERCIAL

## 2019-09-05 VITALS
TEMPERATURE: 99 F | OXYGEN SATURATION: 100 % | DIASTOLIC BLOOD PRESSURE: 85 MMHG | RESPIRATION RATE: 18 BRPM | WEIGHT: 235.89 LBS | HEART RATE: 106 BPM | SYSTOLIC BLOOD PRESSURE: 158 MMHG | HEIGHT: 64 IN

## 2019-09-05 PROCEDURE — 99283 EMERGENCY DEPT VISIT LOW MDM: CPT | Mod: 25

## 2019-09-05 PROCEDURE — 12001 RPR S/N/AX/GEN/TRNK 2.5CM/<: CPT

## 2019-09-05 RX ORDER — TETANUS TOXOID, REDUCED DIPHTHERIA TOXOID AND ACELLULAR PERTUSSIS VACCINE, ADSORBED 5; 2.5; 8; 8; 2.5 [IU]/.5ML; [IU]/.5ML; UG/.5ML; UG/.5ML; UG/.5ML
0.5 SUSPENSION INTRAMUSCULAR ONCE
Refills: 0 | Status: COMPLETED | OUTPATIENT
Start: 2019-09-05 | End: 2019-09-05

## 2019-09-06 PROCEDURE — 90715 TDAP VACCINE 7 YRS/> IM: CPT

## 2019-09-06 PROCEDURE — 99283 EMERGENCY DEPT VISIT LOW MDM: CPT | Mod: 25

## 2019-09-06 PROCEDURE — 90471 IMMUNIZATION ADMIN: CPT

## 2019-09-06 PROCEDURE — 12001 RPR S/N/AX/GEN/TRNK 2.5CM/<: CPT | Mod: F3

## 2019-09-06 RX ADMIN — TETANUS TOXOID, REDUCED DIPHTHERIA TOXOID AND ACELLULAR PERTUSSIS VACCINE, ADSORBED 0.5 MILLILITER(S): 5; 2.5; 8; 8; 2.5 SUSPENSION INTRAMUSCULAR at 00:52

## 2019-09-06 RX ADMIN — Medication 1 TABLET(S): at 00:52

## 2019-09-06 NOTE — ED ADULT NURSE NOTE - OBJECTIVE STATEMENT
33y/o female presented to the ED from home with complaint of finger pain/injury. A&Ox3, ambulatory. Patient states that she was trying to separate turkey burger with a knife when her hand slipped cutting her left ring finger. 3cm laceration noted to left ring finger. Actively bleeding, patient applying paper towel to site. Unsure of last tetanus.

## 2019-09-06 NOTE — ED PROVIDER NOTE - OBJECTIVE STATEMENT
35 yo female in [place] accompanied by father presents to the ER with left 4th digit distal finger laceration. Pt states "I was trying to separate two frozen turkey burgers and I accidentally cut the tip of my finger. I have been holding it but it is still bleeding". Pt's tdap utd.  Avulsion flap to distal pad of finger, sparing fingernail.  Pt reports mild throbbing pain to site. Denies numbness or tingling to finger.

## 2019-09-06 NOTE — ED PROVIDER NOTE - ATTENDING CONTRIBUTION TO CARE
left 4th digit laceration, no nailbed involvement, no other injuries, risk of infection, will cleanse, repair, start antibiotics.

## 2019-09-06 NOTE — ED PROVIDER NOTE - PATIENT PORTAL LINK FT
You can access the FollowMyHealth Patient Portal offered by [facility] by registering at the following website: [email or web address]. By joining [facility]’s FollowMyHealth portal, you will also be able to view your health information using other applications (apps) compatible with our system.

## 2019-09-06 NOTE — ED PROVIDER NOTE - NSFOLLOWUPINSTRUCTIONS_ED_ALL_ED_FT
rest, increase activity as tolerated  Return to the ER in 7-10 days for sutures to be removed  -- Please use 650mg Tylenol (also called acetaminophen) every 4 hours & 600mg Motrin (also called Advil or ibuprofen) every 6 hours as needed for pain/discomfort/swelling. You can get these without a prescription. Don't use more than 3500mg of Tylenol in any 24-hour period. Make sure your other prescription/over-the-counter medications don't contain any Tylenol so you don't take too much. If you have any stomach discomfort while taking Motrin, you can use TUMS or Pepcid or Zantac (these can all be bought without a prescription).   Return to the ER for any concerns such as fevers and chills

## 2019-09-09 ENCOUNTER — APPOINTMENT (OUTPATIENT)
Dept: ENDOCRINOLOGY | Facility: CLINIC | Age: 34
End: 2019-09-09

## 2019-09-27 ENCOUNTER — APPOINTMENT (OUTPATIENT)
Dept: SURGERY | Facility: CLINIC | Age: 34
End: 2019-09-27
Payer: COMMERCIAL

## 2019-09-27 VITALS
SYSTOLIC BLOOD PRESSURE: 144 MMHG | DIASTOLIC BLOOD PRESSURE: 87 MMHG | RESPIRATION RATE: 19 BRPM | OXYGEN SATURATION: 97 % | TEMPERATURE: 98.7 F | HEART RATE: 99 BPM | WEIGHT: 232 LBS | HEIGHT: 64 IN | BODY MASS INDEX: 39.61 KG/M2

## 2019-09-27 DIAGNOSIS — E11.9 TYPE 2 DIABETES MELLITUS W/OUT COMPLICATIONS: ICD-10-CM

## 2019-09-27 DIAGNOSIS — L02.212 CUTANEOUS ABSCESS OF BACK [ANY PART, EXCEPT BUTTOCK]: ICD-10-CM

## 2019-09-27 PROCEDURE — 99213 OFFICE O/P EST LOW 20 MIN: CPT

## 2019-09-27 RX ORDER — LISINOPRIL 5 MG/1
5 TABLET ORAL
Refills: 0 | Status: DISCONTINUED | COMMUNITY
End: 2019-09-27

## 2019-09-27 NOTE — HISTORY OF PRESENT ILLNESS
[de-identified] : Avril is a 35 y/o female here for a follow-up visit. S/P I&D of an infected back abscess at [facility] ED on 8/11/19. \par \par \par Doing very well and is without complaints.  Blood sugars better controlled on insulin.  No drainage or pain at abscess site.

## 2019-09-27 NOTE — PHYSICAL EXAM
[Alert] : alert [Oriented to Person] : oriented to person [Oriented to Place] : oriented to place [Oriented to Time] : oriented to time [Calm] : calm [de-identified] : well-appearing, appears stated age, no acute distress [de-identified] : sclerae anicteric, mucouos membranes moist, normocephalic, trachea midline  [de-identified] : normal respirations and chest expansion  [de-identified] : left upper back I&D site well healed, no induration or erythema, no drainage

## 2019-11-14 ENCOUNTER — APPOINTMENT (OUTPATIENT)
Dept: ENDOCRINOLOGY | Facility: CLINIC | Age: 34
End: 2019-11-14

## 2021-09-22 NOTE — ED ADULT TRIAGE NOTE - CCCP TRG CHIEF CMPLNT
boil in back
Quality 110: Preventive Care And Screening: Influenza Immunization: Influenza immunization was not ordered or administered, reason not given
Quality 130: Documentation Of Current Medications In The Medical Record: Current Medications Documented
Detail Level: Detailed
Additional Notes: Pt received covid vaccine

## 2022-06-15 NOTE — PATIENT PROFILE ADULT - BRADEN ACTIVITY
"Chief Complaint   Patient presents with     Follow Up     Pt here with mom for ear tube follow up.       Temp 97.8  F (36.6  C) (Temporal)   Ht 2' 5.5\" (74.9 cm)   Wt 22 lb 6.4 oz (10.2 kg)   BMI 18.10 kg/m      Litzy Rodriguez  "
(4) walks frequently

## 2023-11-14 NOTE — PROGRESS NOTE ADULT - PROBLEM SELECTOR PROBLEM 1
Type 2 diabetes mellitus with hyperglycemia, without long-term current use of insulin Spiral Flap Text: The defect edges were debeveled with a #15 scalpel blade. Given the location of the defect, shape of the defect and the proximity to free margins a spiral flap was deemed most appropriate. Using a sterile surgical marker, an appropriate rotation flap was drawn incorporating the defect and placing the expected incisions within the relaxed skin tension lines where possible. The area thus outlined was incised deep to adipose tissue with a #15 scalpel blade. The skin margins were undermined to an appropriate distance in all directions utilizing iris scissors. Following this, the designed flap was carried over into the primary defect and sutured into place.
